# Patient Record
Sex: FEMALE | Race: WHITE | Employment: FULL TIME | URBAN - METROPOLITAN AREA
[De-identification: names, ages, dates, MRNs, and addresses within clinical notes are randomized per-mention and may not be internally consistent; named-entity substitution may affect disease eponyms.]

---

## 2018-07-31 ENCOUNTER — HOSPITAL ENCOUNTER (EMERGENCY)
Age: 23
Discharge: HOME | End: 2018-07-31
Attending: EMERGENCY MEDICINE
Payer: COMMERCIAL

## 2018-07-31 VITALS
DIASTOLIC BLOOD PRESSURE: 75 MMHG | WEIGHT: 120 LBS | BODY MASS INDEX: 22.66 KG/M2 | SYSTOLIC BLOOD PRESSURE: 111 MMHG | RESPIRATION RATE: 14 BRPM | HEART RATE: 92 BPM | OXYGEN SATURATION: 100 % | HEIGHT: 61 IN | TEMPERATURE: 98.1 F

## 2018-07-31 DIAGNOSIS — B00.1 COLD SORE: Primary | ICD-10-CM

## 2018-07-31 DIAGNOSIS — R51.9 ACUTE NONINTRACTABLE HEADACHE, UNSPECIFIED HEADACHE TYPE: ICD-10-CM

## 2018-07-31 PROCEDURE — 99282 EMERGENCY DEPT VISIT SF MDM: CPT

## 2018-07-31 PROCEDURE — 6370000000 HC RX 637 (ALT 250 FOR IP): Performed by: PHYSICIAN ASSISTANT

## 2018-07-31 RX ORDER — DOCOSANOL 100 MG/G
CREAM TOPICAL
Qty: 1 TUBE | Refills: 0 | Status: SHIPPED | OUTPATIENT
Start: 2018-07-31

## 2018-07-31 RX ORDER — IBUPROFEN 800 MG/1
800 TABLET ORAL ONCE
Status: COMPLETED | OUTPATIENT
Start: 2018-07-31 | End: 2018-07-31

## 2018-07-31 RX ORDER — FAMCICLOVIR 500 MG/1
500 TABLET, FILM COATED ORAL 3 TIMES DAILY
Qty: 15 TABLET | Refills: 0 | Status: SHIPPED | OUTPATIENT
Start: 2018-07-31 | End: 2018-08-05

## 2018-07-31 RX ADMIN — IBUPROFEN 800 MG: 800 TABLET ORAL at 17:52

## 2018-07-31 ASSESSMENT — PAIN SCALES - GENERAL: PAINLEVEL_OUTOF10: 3

## 2018-07-31 NOTE — ED PROVIDER NOTES
mouth every 6 hours as needed for Pain, Disp-30 tablet, R-0      montelukast (SINGULAIR) 10 MG tablet Take 10 mg by mouth nightly. albuterol (PROAIR HFA) 108 (90 BASE) MCG/ACT inhaler Inhale 2 puffs into the lungs every 6 hours as needed. albuterol (PROVENTIL) (2.5 MG/3ML) 0.083% nebulizer solution Take 2.5 mg by nebulization every 6 hours as needed. ALLERGIES     Patient has no known allergies. FAMILY HISTORY       Family History   Problem Relation Age of Onset    Asthma Mother     Allergy (Severe) Mother     Allergy (Severe) Sister     Allergy (Severe) Brother     Asthma Maternal Aunt      Family Status   Relation Status    Mother (Not Specified)    Sister (Not Specified)    Brother (Not Specified)    MAunt (Not Specified)      None otherwise stated in nurses notes    SOCIAL HISTORY      reports that she is a non-smoker but has been exposed to tobacco smoke. She has never used smokeless tobacco. She reports that she does not drink alcohol or use drugs. lives at home with others     PHYSICAL EXAM    (up to 7 for level 4, 8 or more for level 5)     ED Triage Vitals [07/31/18 1726]   BP Temp Temp Source Pulse Resp SpO2 Height Weight   115/65 98.1 °F (36.7 °C) Oral 109 18 99 % 5' 1\" (1.549 m) 120 lb (54.4 kg)       Physical Exam   Nursing note and vitals reviewed. Constitutional: Oriented to person, place, and time and well-developed, well-nourished. Head: Normocephalic and atraumatic. Clustered vesicles over right lower lip. Erythematous. Tender. No drainage, induration. Crusting noted. Ear: External ears normal.   Nose: Nose normal and midline. Eyes: Conjunctivae and EOM are normal. Pupils are equal, round, and reactive to light. Neck: Normal range of motion. Neck supple. right cervical lymphadenopathy.    Throat: Posterior pharynx is without erythema or exudates, airway is patent, no swelling  Cardiovascular: Normal rate, regular rhythm, normal heart sounds and Cold sore    2. Acute nonintractable headache, unspecified headache type          DISPOSITION/PLAN   DISPOSITION Decision To Discharge    PATIENT REFERRED TO:  [facility]  [address] [address]  [phone]    If symptoms worsen    pcp  see clinic list  Call         DISCHARGE MEDICATIONS:  Discharge Medication List as of 7/31/2018  5:45 PM      START taking these medications    Details   famciclovir (FAMVIR) 500 MG tablet Take 1 tablet by mouth 3 times daily for 5 days, Disp-15 tablet, R-0Print      docosanol (ABREVA) 10 % CREA cream Apply to cold sore 5 times a day as needed. , Disp-1 Tube, R-0Print               Summation      Patient Course:  Pain for several days, sore over lip appeared yesterday. Exam is consistent with cold sore. Afebrile. Pt has hx of cold sores. Will treat with antivirals and abreva. Informed the sore is contagious. She is not to kiss her kids, share drinks, food until sore is healed. Instructions provided. Pt understands and agrees with plan. ED Medications administered this visit:    Medications   ibuprofen (ADVIL;MOTRIN) tablet 800 mg (800 mg Oral Given 7/31/18 1752)       New Prescriptions from this visit:    Discharge Medication List as of 7/31/2018  5:45 PM      START taking these medications    Details   famciclovir (FAMVIR) 500 MG tablet Take 1 tablet by mouth 3 times daily for 5 days, Disp-15 tablet, R-0Print      docosanol (ABREVA) 10 % CREA cream Apply to cold sore 5 times a day as needed. , Disp-1 Tube, R-0Print             Follow-up:  [facility]  [address] [id number] [phone]    If symptoms worsen    pcp  see clinic list  Call           Final Impression:   1. Cold sore    2.  Acute nonintractable headache, unspecified headache type               (Please note that portions of this note were completed with a voice recognition program.  Efforts were made to edit the dictations but occasionally words are mis-transcribed.)      (Please note that portions of this note were completed with a voice recognition program.  Efforts were made to edit the dictations but occasionally words are mis-transcribed.)    Hayes Riley, [address] Par[address] Chew PA-C  07/31/18 9880

## 2021-03-09 ENCOUNTER — HOSPITAL ENCOUNTER (OUTPATIENT)
Age: 26
Discharge: HOME | End: 2021-03-09

## 2021-03-09 LAB — RUBV IGG SER QL: 62.4 IU/ML

## 2021-03-09 PROCEDURE — 86787 VARICELLA-ZOSTER ANTIBODY: CPT

## 2021-03-09 PROCEDURE — 86762 RUBELLA ANTIBODY: CPT

## 2021-03-09 PROCEDURE — 86481 TB AG RESPONSE T-CELL SUSP: CPT

## 2021-03-09 PROCEDURE — 86735 MUMPS ANTIBODY: CPT

## 2021-03-09 PROCEDURE — 86765 RUBEOLA ANTIBODY: CPT

## 2021-03-10 LAB
MEASLES ANTIBODY IGG: 3.61
MUV IGG SER QL: 4.16
VZV IGG SER QL IA: 1.9

## 2021-03-12 LAB — T-SPOT TB TEST: NORMAL

## 2021-11-19 ENCOUNTER — HOSPITAL ENCOUNTER (OUTPATIENT)
Age: 26
Setting detail: SPECIMEN
Discharge: HOME | End: 2021-11-19

## 2021-11-19 LAB
BILIRUBIN URINE: NEGATIVE
COLOR: YELLOW
COMMENT UA: ABNORMAL
GLUCOSE URINE: NEGATIVE
KETONES, URINE: ABNORMAL
LEUKOCYTE ESTERASE, URINE: NEGATIVE
NITRITE, URINE: NEGATIVE
PH UA: 7.5 (ref 5–8)
PROTEIN UA: NEGATIVE
SPECIFIC GRAVITY UA: 1.02 (ref 1–1.03)
TURBIDITY: CLEAR
URINE HGB: NEGATIVE
UROBILINOGEN, URINE: NORMAL

## 2022-08-22 ENCOUNTER — HOSPITAL ENCOUNTER (OUTPATIENT)
Age: 27
Setting detail: SPECIMEN
Discharge: HOME | End: 2022-08-22

## 2022-08-22 LAB
ABSOLUTE EOS #: 0.15 K/UL (ref 0–0.44)
ABSOLUTE IMMATURE GRANULOCYTE: <0.03 K/UL (ref 0–0.3)
ABSOLUTE LYMPH #: 1.86 K/UL (ref 1.1–3.7)
ABSOLUTE MONO #: 0.45 K/UL (ref 0.1–1.2)
ALBUMIN SERPL-MCNC: 4.7 G/DL (ref 3.5–5.2)
ALBUMIN/GLOBULIN RATIO: 1.6 (ref 1–2.5)
ALP BLD-CCNC: 52 U/L (ref 35–104)
ALT SERPL-CCNC: 45 U/L (ref 5–33)
ANION GAP SERPL CALCULATED.3IONS-SCNC: 19 MMOL/L (ref 9–17)
AST SERPL-CCNC: 39 U/L
BASOPHILS # BLD: 1 % (ref 0–2)
BASOPHILS ABSOLUTE: 0.05 K/UL (ref 0–0.2)
BILIRUB SERPL-MCNC: 0.44 MG/DL (ref 0.3–1.2)
BUN BLDV-MCNC: 9 MG/DL (ref 6–20)
CALCIUM SERPL-MCNC: 9.6 MG/DL (ref 8.6–10.4)
CHLORIDE BLD-SCNC: 100 MMOL/L (ref 98–107)
CHOLESTEROL/HDL RATIO: 4.8
CHOLESTEROL: 186 MG/DL
CO2: 21 MMOL/L (ref 20–31)
CREAT SERPL-MCNC: 0.7 MG/DL (ref 0.5–0.9)
EOSINOPHILS RELATIVE PERCENT: 3 % (ref 1–4)
FERRITIN: 81 NG/ML (ref 13–150)
FOLATE: 13.5 NG/ML
GFR AFRICAN AMERICAN: >60 ML/MIN
GFR NON-AFRICAN AMERICAN: >60 ML/MIN
GFR SERPL CREATININE-BSD FRML MDRD: ABNORMAL ML/MIN/{1.73_M2}
GLUCOSE BLD-MCNC: 104 MG/DL (ref 70–99)
HCT VFR BLD CALC: 42.5 % (ref 36.3–47.1)
HDLC SERPL-MCNC: 39 MG/DL
HEMOGLOBIN: 14.7 G/DL (ref 11.9–15.1)
IMMATURE GRANULOCYTES: 0 %
IRON SATURATION: 43 % (ref 20–55)
IRON: 127 UG/DL (ref 37–145)
LDL CHOLESTEROL: 116 MG/DL (ref 0–130)
LYMPHOCYTES # BLD: 31 % (ref 24–43)
MCH RBC QN AUTO: 31 PG (ref 25.2–33.5)
MCHC RBC AUTO-ENTMCNC: 34.6 G/DL (ref 28.4–34.8)
MCV RBC AUTO: 89.7 FL (ref 82.6–102.9)
MONOCYTES # BLD: 8 % (ref 3–12)
NRBC AUTOMATED: 0 PER 100 WBC
PDW BLD-RTO: 11.8 % (ref 11.8–14.4)
PLATELET # BLD: 307 K/UL (ref 138–453)
PMV BLD AUTO: 10.6 FL (ref 8.1–13.5)
POTASSIUM SERPL-SCNC: 4.5 MMOL/L (ref 3.7–5.3)
RBC # BLD: 4.74 M/UL (ref 3.95–5.11)
SEG NEUTROPHILS: 57 % (ref 36–65)
SEGMENTED NEUTROPHILS ABSOLUTE COUNT: 3.45 K/UL (ref 1.5–8.1)
SODIUM BLD-SCNC: 140 MMOL/L (ref 135–144)
T3 TOTAL: 126 NG/DL (ref 60–181)
TOTAL IRON BINDING CAPACITY: 294 UG/DL (ref 250–450)
TOTAL PROTEIN: 7.6 G/DL (ref 6.4–8.3)
TRIGL SERPL-MCNC: 156 MG/DL
TSH SERPL DL<=0.05 MIU/L-ACNC: 2.05 UIU/ML (ref 0.3–5)
UNSATURATED IRON BINDING CAPACITY: 167 UG/DL (ref 112–347)
VITAMIN B-12: 534 PG/ML (ref 232–1245)
VITAMIN D 25-HYDROXY: 21.5 NG/ML
WBC # BLD: 6 K/UL (ref 3.5–11.3)

## 2022-08-23 LAB — T4 TOTAL: 8.1 UG/DL (ref 4.5–10.9)

## 2022-08-26 ENCOUNTER — HOSPITAL ENCOUNTER (OUTPATIENT)
Age: 27
Setting detail: SPECIMEN
Discharge: HOME | End: 2022-08-26

## 2022-08-26 LAB
CANDIDA SPECIES, DNA PROBE: NEGATIVE
GARDNERELLA VAGINALIS, DNA PROBE: POSITIVE
SOURCE: ABNORMAL
TRICHOMONAS VAGINALIS DNA: NEGATIVE

## 2022-08-29 LAB
C TRACH DNA GENITAL QL NAA+PROBE: NEGATIVE
N. GONORRHOEAE DNA: NEGATIVE
SPECIMEN DESCRIPTION: NORMAL

## 2022-08-31 LAB
HPV SAMPLE: ABNORMAL
HPV, GENOTYPE 16: ABNORMAL
HPV, GENOTYPE 18: ABNORMAL
HPV, HIGH RISK OTHER: ABNORMAL
HPV, INTERPRETATION: ABNORMAL
SPECIMEN DESCRIPTION: ABNORMAL

## 2022-09-06 LAB — CYTOLOGY REPORT: NORMAL

## 2024-01-02 ENCOUNTER — HOSPITAL ENCOUNTER (OUTPATIENT)
Dept: ULTRASOUND IMAGING | Age: 29
Discharge: HOME | End: 2024-01-04

## 2024-01-02 DIAGNOSIS — R31.9 HEMATURIA, UNSPECIFIED TYPE: ICD-10-CM

## 2024-01-02 PROCEDURE — 76770 US EXAM ABDO BACK WALL COMP: CPT

## 2024-01-08 ENCOUNTER — OFFICE VISIT (OUTPATIENT)
Dept: UROLOGY | Age: 29
End: 2024-01-08
Payer: COMMERCIAL

## 2024-01-08 VITALS
SYSTOLIC BLOOD PRESSURE: 112 MMHG | DIASTOLIC BLOOD PRESSURE: 68 MMHG | TEMPERATURE: 98.2 F | HEIGHT: 61 IN | BODY MASS INDEX: 30.21 KG/M2 | HEART RATE: 72 BPM | OXYGEN SATURATION: 100 % | WEIGHT: 160 LBS

## 2024-01-08 DIAGNOSIS — R10.9 RIGHT FLANK PAIN: Primary | ICD-10-CM

## 2024-01-08 DIAGNOSIS — R31.0 GROSS HEMATURIA: ICD-10-CM

## 2024-01-08 DIAGNOSIS — N13.30 HYDRONEPHROSIS OF RIGHT KIDNEY: ICD-10-CM

## 2024-01-08 DIAGNOSIS — Z87.442 HISTORY OF KIDNEY STONES: ICD-10-CM

## 2024-01-08 PROCEDURE — 99204 OFFICE O/P NEW MOD 45 MIN: CPT | Performed by: UROLOGY

## 2024-01-08 RX ORDER — TRAMADOL HYDROCHLORIDE 50 MG/1
50 TABLET ORAL EVERY 6 HOURS PRN
COMMUNITY
Start: 2024-01-02 | End: 2024-01-09

## 2024-01-08 RX ORDER — ACETAMINOPHEN 500 MG
500 TABLET ORAL EVERY 6 HOURS PRN
COMMUNITY
Start: 2020-08-18

## 2024-01-08 RX ORDER — DEXAMETHASONE 4 MG/1
2 TABLET ORAL 2 TIMES DAILY
COMMUNITY
Start: 2023-12-29

## 2024-01-08 RX ORDER — TAMSULOSIN HYDROCHLORIDE 0.4 MG/1
0.4 CAPSULE ORAL DAILY
COMMUNITY

## 2024-01-08 RX ORDER — ONDANSETRON 4 MG/1
4 TABLET, ORALLY DISINTEGRATING ORAL EVERY 8 HOURS PRN
COMMUNITY
Start: 2019-12-27 | End: 2024-01-09

## 2024-01-08 ASSESSMENT — ENCOUNTER SYMPTOMS
EYE PAIN: 0
BACK PAIN: 1
NAUSEA: 1
SHORTNESS OF BREATH: 0
EYE REDNESS: 0
EYES NEGATIVE: 1
RESPIRATORY NEGATIVE: 1
WHEEZING: 0
VOMITING: 0
ABDOMINAL PAIN: 0
COUGH: 0

## 2024-01-08 NOTE — PROGRESS NOTES
[facility] PHYSICIANS [facility]  [address]  [facility] [address]  Dept: [phone]    [facility] Urology Office Note - New Patient    Patient:  Sherrie Richards  YOB: 1995  Date: 2024    The patient is a 28 y.o. female who presentstoday for evaluation of the following problems:   Chief Complaint   Patient presents with    Stone    referred by No primary care provider on file..    HPI  This is a very pleasant 28-year-old female who has a history of stones.  She has had intermittent right flank pain for the last month or so.  She did have a renal ultrasound last week which does show right hydronephrosis.  It also shows a nonobstructing right renal stone.  She does continue to have intermittent pain, although it is getting better.    (Patient's old records have been requested, reviewed and summarized in today's note.)    Summary of old records: N/A    History: N/A    ProceduresToday: N/A    Urinalysis today:  No results found for this visit on 24.    AUA Symptom Score (2024):                               Last BUN andcreatinine:  Lab Results   Component Value Date    BUN 9 2022     Lab Results   Component Value Date    CREATININE 0.70 2022       Additional Lab/Culture results: none    Reviewed during this Office Visit: none  (results were independently reviewed byphysician and radiology report verified)    PAST MEDICAL, FAMILY AND SOCIAL HISTORY:  Past Medical History:   Diagnosis Date    Asthma     MTHFR (methylene THF reductase) deficiency and homocystinuria (HCC)      Past Surgical History:   Procedure Laterality Date    ADENOIDECTOMY       SECTION      TONSILLECTOMY       Family History   Problem Relation Age of Onset    Asthma Mother     Allergy (Severe) Mother     Allergy (Severe) Sister     Allergy (Severe) Brother     Asthma Maternal Aunt      Outpatient Medications Marked as Taking for the

## 2024-01-08 NOTE — PROGRESS NOTES
Review of Systems   Constitutional: Negative.  Negative for activity change, chills and fever.   Eyes: Negative.  Negative for pain, redness and visual disturbance.   Respiratory: Negative.  Negative for cough, shortness of breath and wheezing.    Cardiovascular:  Negative for chest pain and leg swelling.   Gastrointestinal:  Positive for nausea. Negative for abdominal pain and vomiting.   Genitourinary:  Positive for frequency. Negative for difficulty urinating, dysuria, enuresis, flank pain, hematuria and urgency.   Musculoskeletal:  Positive for back pain. Negative for joint swelling and myalgias.   Skin:  Negative for rash and wound.   Neurological: Negative.  Negative for dizziness, tremors and numbness.   Hematological: Negative.  Does not bruise/bleed easily.

## 2024-01-09 ENCOUNTER — NURSE TRIAGE (OUTPATIENT)
Dept: OTHER | Facility: CLINIC | Age: 29
End: 2024-01-09

## 2024-01-09 ENCOUNTER — HOSPITAL ENCOUNTER (OUTPATIENT)
Age: 29
Setting detail: OBSERVATION
Discharge: HOME | End: 2024-01-12
Attending: EMERGENCY MEDICINE | Admitting: INTERNAL MEDICINE
Payer: COMMERCIAL

## 2024-01-09 ENCOUNTER — APPOINTMENT (OUTPATIENT)
Dept: CT IMAGING | Age: 29
End: 2024-01-09
Payer: COMMERCIAL

## 2024-01-09 DIAGNOSIS — N20.0 KIDNEY STONE: Primary | ICD-10-CM

## 2024-01-09 PROBLEM — N13.9 OBSTRUCTIVE UROPATHY: Status: ACTIVE | Noted: 2024-01-09

## 2024-01-09 LAB
ALBUMIN SERPL-MCNC: 4.4 G/DL (ref 3.5–5.2)
ALP SERPL-CCNC: 51 U/L (ref 35–104)
ALT SERPL-CCNC: 49 U/L (ref 5–33)
ANION GAP SERPL CALCULATED.3IONS-SCNC: 14 MMOL/L (ref 9–17)
AST SERPL-CCNC: 38 U/L
BACTERIA URNS QL MICRO: ABNORMAL
BASOPHILS # BLD: 0 K/UL (ref 0–0.2)
BASOPHILS NFR BLD: 0 % (ref 0–2)
BILIRUB DIRECT SERPL-MCNC: 0.1 MG/DL
BILIRUB INDIRECT SERPL-MCNC: 0.4 MG/DL (ref 0–1)
BILIRUB SERPL-MCNC: 0.5 MG/DL (ref 0.3–1.2)
BILIRUB UR QL STRIP: NEGATIVE
BUN SERPL-MCNC: 10 MG/DL (ref 6–20)
CALCIUM SERPL-MCNC: 9.6 MG/DL (ref 8.6–10.4)
CHLORIDE SERPL-SCNC: 100 MMOL/L (ref 98–107)
CLARITY UR: CLEAR
CO2 SERPL-SCNC: 23 MMOL/L (ref 20–31)
COLOR UR: YELLOW
CREAT SERPL-MCNC: 0.9 MG/DL (ref 0.5–0.9)
EOSINOPHIL # BLD: 0.1 K/UL (ref 0–0.4)
EOSINOPHILS RELATIVE PERCENT: 1 % (ref 0–4)
EPI CELLS #/AREA URNS HPF: ABNORMAL /HPF
ERYTHROCYTE [DISTWIDTH] IN BLOOD BY AUTOMATED COUNT: 12.3 % (ref 11.5–14.9)
GFR SERPL CREATININE-BSD FRML MDRD: >60 ML/MIN/1.73M2
GLUCOSE SERPL-MCNC: 103 MG/DL (ref 70–99)
GLUCOSE UR STRIP-MCNC: NEGATIVE MG/DL
HCG UR QL: NEGATIVE
HCT VFR BLD AUTO: 39.4 % (ref 36–46)
HGB BLD-MCNC: 13.4 G/DL (ref 12–16)
HGB UR QL STRIP.AUTO: ABNORMAL
KETONES UR STRIP-MCNC: NEGATIVE MG/DL
LACTATE BLDV-SCNC: 0.9 MMOL/L (ref 0.5–2.2)
LEUKOCYTE ESTERASE UR QL STRIP: ABNORMAL
LYMPHOCYTES NFR BLD: 1.4 K/UL (ref 1–4.8)
LYMPHOCYTES RELATIVE PERCENT: 15 % (ref 24–44)
MCH RBC QN AUTO: 30.4 PG (ref 26–34)
MCHC RBC AUTO-ENTMCNC: 34.1 G/DL (ref 31–37)
MCV RBC AUTO: 89.3 FL (ref 80–100)
MONOCYTES NFR BLD: 0.7 K/UL (ref 0.1–1.3)
MONOCYTES NFR BLD: 7 % (ref 1–7)
NEUTROPHILS NFR BLD: 77 % (ref 36–66)
NEUTS SEG NFR BLD: 7.1 K/UL (ref 1.3–9.1)
NITRITE UR QL STRIP: NEGATIVE
PH UR STRIP: 5.5 [PH] (ref 5–8)
PLATELET # BLD AUTO: 270 K/UL (ref 150–450)
PMV BLD AUTO: 7.9 FL (ref 6–12)
POTASSIUM SERPL-SCNC: 3.8 MMOL/L (ref 3.7–5.3)
PROT SERPL-MCNC: 7.5 G/DL (ref 6.4–8.3)
PROT UR STRIP-MCNC: ABNORMAL MG/DL
RBC # BLD AUTO: 4.41 M/UL (ref 4–5.2)
RBC #/AREA URNS HPF: ABNORMAL /HPF
SODIUM SERPL-SCNC: 137 MMOL/L (ref 135–144)
SP GR UR STRIP: 1.01 (ref 1–1.03)
UROBILINOGEN UR STRIP-ACNC: NORMAL EU/DL (ref 0–1)
WBC #/AREA URNS HPF: ABNORMAL /HPF
WBC OTHER # BLD: 9.3 K/UL (ref 3.5–11)

## 2024-01-09 PROCEDURE — 74176 CT ABD & PELVIS W/O CONTRAST: CPT

## 2024-01-09 PROCEDURE — 83605 ASSAY OF LACTIC ACID: CPT

## 2024-01-09 PROCEDURE — 81025 URINE PREGNANCY TEST: CPT

## 2024-01-09 PROCEDURE — G0378 HOSPITAL OBSERVATION PER HR: HCPCS

## 2024-01-09 PROCEDURE — 96374 THER/PROPH/DIAG INJ IV PUSH: CPT

## 2024-01-09 PROCEDURE — 96375 TX/PRO/DX INJ NEW DRUG ADDON: CPT

## 2024-01-09 PROCEDURE — 80048 BASIC METABOLIC PNL TOTAL CA: CPT

## 2024-01-09 PROCEDURE — 87086 URINE CULTURE/COLONY COUNT: CPT

## 2024-01-09 PROCEDURE — 6360000002 HC RX W HCPCS: Performed by: STUDENT IN AN ORGANIZED HEALTH CARE EDUCATION/TRAINING PROGRAM

## 2024-01-09 PROCEDURE — 96361 HYDRATE IV INFUSION ADD-ON: CPT

## 2024-01-09 PROCEDURE — 80076 HEPATIC FUNCTION PANEL: CPT

## 2024-01-09 PROCEDURE — 2580000003 HC RX 258: Performed by: NURSE PRACTITIONER

## 2024-01-09 PROCEDURE — 2580000003 HC RX 258: Performed by: STUDENT IN AN ORGANIZED HEALTH CARE EDUCATION/TRAINING PROGRAM

## 2024-01-09 PROCEDURE — 85025 COMPLETE CBC W/AUTO DIFF WBC: CPT

## 2024-01-09 PROCEDURE — 81001 URINALYSIS AUTO W/SCOPE: CPT

## 2024-01-09 PROCEDURE — 99285 EMERGENCY DEPT VISIT HI MDM: CPT

## 2024-01-09 PROCEDURE — 36415 COLL VENOUS BLD VENIPUNCTURE: CPT

## 2024-01-09 RX ORDER — POTASSIUM CHLORIDE 7.45 MG/ML
10 INJECTION INTRAVENOUS PRN
Status: DISCONTINUED | OUTPATIENT
Start: 2024-01-09 | End: 2024-01-12 | Stop reason: HOSPADM

## 2024-01-09 RX ORDER — SODIUM CHLORIDE 0.9 % (FLUSH) 0.9 %
10 SYRINGE (ML) INJECTION PRN
Status: DISCONTINUED | OUTPATIENT
Start: 2024-01-09 | End: 2024-01-12 | Stop reason: HOSPADM

## 2024-01-09 RX ORDER — ONDANSETRON 2 MG/ML
4 INJECTION INTRAMUSCULAR; INTRAVENOUS EVERY 6 HOURS PRN
Status: DISCONTINUED | OUTPATIENT
Start: 2024-01-09 | End: 2024-01-12 | Stop reason: HOSPADM

## 2024-01-09 RX ORDER — POTASSIUM CHLORIDE 20 MEQ/1
40 TABLET, EXTENDED RELEASE ORAL PRN
Status: DISCONTINUED | OUTPATIENT
Start: 2024-01-09 | End: 2024-01-12 | Stop reason: HOSPADM

## 2024-01-09 RX ORDER — TAMSULOSIN HYDROCHLORIDE 0.4 MG/1
0.4 CAPSULE ORAL DAILY
Status: DISCONTINUED | OUTPATIENT
Start: 2024-01-10 | End: 2024-01-10

## 2024-01-09 RX ORDER — MAGNESIUM SULFATE HEPTAHYDRATE 40 MG/ML
2000 INJECTION, SOLUTION INTRAVENOUS PRN
Status: DISCONTINUED | OUTPATIENT
Start: 2024-01-09 | End: 2024-01-12 | Stop reason: HOSPADM

## 2024-01-09 RX ORDER — ACETAMINOPHEN 650 MG/1
650 SUPPOSITORY RECTAL EVERY 6 HOURS PRN
Status: DISCONTINUED | OUTPATIENT
Start: 2024-01-09 | End: 2024-01-12 | Stop reason: HOSPADM

## 2024-01-09 RX ORDER — FLUTICASONE PROPIONATE 110 UG/1
2 AEROSOL, METERED RESPIRATORY (INHALATION) 2 TIMES DAILY
Status: DISCONTINUED | OUTPATIENT
Start: 2024-01-09 | End: 2024-01-12 | Stop reason: HOSPADM

## 2024-01-09 RX ORDER — SODIUM CHLORIDE 0.9 % (FLUSH) 0.9 %
5-40 SYRINGE (ML) INJECTION EVERY 12 HOURS SCHEDULED
Status: DISCONTINUED | OUTPATIENT
Start: 2024-01-09 | End: 2024-01-12 | Stop reason: HOSPADM

## 2024-01-09 RX ORDER — ALBUTEROL SULFATE 90 UG/1
2 AEROSOL, METERED RESPIRATORY (INHALATION) EVERY 6 HOURS PRN
Status: DISCONTINUED | OUTPATIENT
Start: 2024-01-09 | End: 2024-01-12 | Stop reason: HOSPADM

## 2024-01-09 RX ORDER — KETOROLAC TROMETHAMINE 30 MG/ML
30 INJECTION, SOLUTION INTRAMUSCULAR; INTRAVENOUS ONCE
Status: COMPLETED | OUTPATIENT
Start: 2024-01-09 | End: 2024-01-09

## 2024-01-09 RX ORDER — SODIUM CHLORIDE 9 MG/ML
INJECTION, SOLUTION INTRAVENOUS PRN
Status: DISCONTINUED | OUTPATIENT
Start: 2024-01-09 | End: 2024-01-12 | Stop reason: HOSPADM

## 2024-01-09 RX ORDER — ONDANSETRON 4 MG/1
4 TABLET, ORALLY DISINTEGRATING ORAL EVERY 8 HOURS PRN
Status: DISCONTINUED | OUTPATIENT
Start: 2024-01-09 | End: 2024-01-12 | Stop reason: HOSPADM

## 2024-01-09 RX ORDER — SODIUM CHLORIDE 9 MG/ML
INJECTION, SOLUTION INTRAVENOUS CONTINUOUS
Status: DISCONTINUED | OUTPATIENT
Start: 2024-01-09 | End: 2024-01-12 | Stop reason: HOSPADM

## 2024-01-09 RX ORDER — MORPHINE SULFATE 4 MG/ML
4 INJECTION, SOLUTION INTRAMUSCULAR; INTRAVENOUS
Status: COMPLETED | OUTPATIENT
Start: 2024-01-09 | End: 2024-01-09

## 2024-01-09 RX ORDER — 0.9 % SODIUM CHLORIDE 0.9 %
1000 INTRAVENOUS SOLUTION INTRAVENOUS ONCE
Status: COMPLETED | OUTPATIENT
Start: 2024-01-09 | End: 2024-01-09

## 2024-01-09 RX ORDER — ENOXAPARIN SODIUM 100 MG/ML
40 INJECTION SUBCUTANEOUS DAILY
Status: DISCONTINUED | OUTPATIENT
Start: 2024-01-10 | End: 2024-01-12 | Stop reason: HOSPADM

## 2024-01-09 RX ORDER — ACETAMINOPHEN 325 MG/1
650 TABLET ORAL EVERY 6 HOURS PRN
Status: DISCONTINUED | OUTPATIENT
Start: 2024-01-09 | End: 2024-01-12 | Stop reason: HOSPADM

## 2024-01-09 RX ORDER — KETOROLAC TROMETHAMINE 30 MG/ML
30 INJECTION, SOLUTION INTRAMUSCULAR; INTRAVENOUS EVERY 6 HOURS PRN
Status: DISCONTINUED | OUTPATIENT
Start: 2024-01-09 | End: 2024-01-12 | Stop reason: HOSPADM

## 2024-01-09 RX ORDER — ONDANSETRON 2 MG/ML
4 INJECTION INTRAMUSCULAR; INTRAVENOUS ONCE
Status: COMPLETED | OUTPATIENT
Start: 2024-01-09 | End: 2024-01-09

## 2024-01-09 RX ADMIN — ONDANSETRON 4 MG: 2 INJECTION INTRAMUSCULAR; INTRAVENOUS at 15:58

## 2024-01-09 RX ADMIN — SODIUM CHLORIDE: 9 INJECTION, SOLUTION INTRAVENOUS at 22:08

## 2024-01-09 RX ADMIN — MORPHINE SULFATE 4 MG: 4 INJECTION, SOLUTION INTRAMUSCULAR; INTRAVENOUS at 15:58

## 2024-01-09 RX ADMIN — SODIUM CHLORIDE 1000 ML: 9 INJECTION, SOLUTION INTRAVENOUS at 15:58

## 2024-01-09 RX ADMIN — KETOROLAC TROMETHAMINE 30 MG: 30 INJECTION, SOLUTION INTRAMUSCULAR; INTRAVENOUS at 15:57

## 2024-01-09 ASSESSMENT — LIFESTYLE VARIABLES
HOW OFTEN DO YOU HAVE A DRINK CONTAINING ALCOHOL: NEVER
HOW MANY STANDARD DRINKS CONTAINING ALCOHOL DO YOU HAVE ON A TYPICAL DAY: PATIENT DOES NOT DRINK

## 2024-01-09 NOTE — ED PROVIDER NOTES
[facility] MED SURG  Emergency Department Encounter  Emergency Medicine Resident     Pt Name:Sherrie Richards  MRN: [id number]  Birthdate 1995  Date of evaluation: 1/9/24  PCP:  Brielle Hayward APRN - CNP  Note Started: 3:03 PM EST      CHIEF COMPLAINT       Chief Complaint   Patient presents with    Flank Pain    Dysuria       HISTORY OF PRESENT ILLNESS  (Location/Symptom, Timing/Onset, Context/Setting, Quality, Duration, Modifying Factors, Severity.)      Shrerie Richards is a 28 y.o. female who presents with right-sided flank pain and dysuria.  Patient reports that she has been having approximately 1 month of flank pain.  She reports that she did have some blood in her urine approximately a month ago that has seemed to resolve but she does notice some spotting when she wipes.  She reports that is very pinkish and she is not sure if she is having vaginal bleeding.  She additionally reports that she is having significant frequency and making only small amounts of urine.  She reports that she is having some discomfort in the suprapubic region and in the right flank at this time.  She reports that her pain started on the left but seems to be in the right now.  She reports that she has been taking Flomax as advised by her nurse practitioner and had been taking tramadol but due to her significant nausea she stopped taking it as she was unsure if she was having a reaction to it.  She reports she has had more persistent pain today since 7 AM in addition to nausea and vomiting.  She denies any fevers but does acknowledge some chills.  She does not believe she be pregnant but does not have regular periods.  She denies vaginal discharge.  She reports that she did at one time passed a shard shaped stone.    PAST MEDICAL / SURGICAL / SOCIAL / FAMILY HISTORY      has a past medical history of Asthma and MTHFR (methylene THF reductase) deficiency and homocystinuria (HCC).     has a past surgical history that includes  12/29/23   Yumiko Francois MD   tamsulosin (FLOMAX) 0.4 MG capsule Take 1 capsule by mouth daily    Yumiko Francois MD   albuterol (PROAIR HFA) 108 (90 BASE) MCG/ACT inhaler Inhale 2 puffs into the lungs every 6 hours as needed    Yumiko Francois MD   albuterol (PROVENTIL) (2.5 MG/3ML) 0.083% nebulizer solution Take 2.5 mg by nebulization every 6 hours as needed.  Patient not taking: Reported on 1/9/2024    Yumiko Francois MD       REVIEW OF SYSTEMS       See HPI    PHYSICAL EXAM      INITIAL VITALS:   /82   Pulse 82   Temp 98.1 °F (36.7 °C)   Resp 18   Ht 1.54 m (5' 0.63\")   Wt 72.6 kg (160 lb)   SpO2 97%   BMI 30.60 kg/m²     Physical Exam  Vitals reviewed.   Constitutional:       General: She is not in acute distress.     Appearance: She is not ill-appearing.   Eyes:      Conjunctiva/sclera: Conjunctivae normal.   Cardiovascular:      Rate and Rhythm: Regular rhythm. Tachycardia present.      Heart sounds: Normal heart sounds.   Pulmonary:      Effort: Pulmonary effort is normal.      Breath sounds: Normal breath sounds.   Abdominal:      General: Abdomen is flat.      Tenderness: There is abdominal tenderness (suprapubic). There is right CVA tenderness. There is no left CVA tenderness.   Musculoskeletal:         General: Normal range of motion.   Skin:     General: Skin is warm and dry.   Neurological:      General: No focal deficit present.      Mental Status: She is alert.           DDX/DIAGNOSTIC RESULTS / EMERGENCY DEPARTMENT COURSE / MDM     Medical Decision Making  Patient is a 28-year-old female presenting due to nausea and right flank pain in conjunction with dysuria.  Upon examination patient appears nontoxic but is mildly tachycardic, somewhat hypertensive afebrile saturating well on room air in no acute distress.  Given patient's symptoms differential includes nephrolithiasis, urinary tract infection, pyelonephritis, appendicitis, pregnancy, ectopic pregnancy,

## 2024-01-09 NOTE — TELEPHONE ENCOUNTER
Location of patient: [place]    Subjective: Caller(Keren) from [facility] on the line stating she would like to transfer member to nurse line for question.  Hard transfer rec'd , pt states she just spoke to nurse triage, does not have further questions, that she is planning to go to ER now. States she was advised by [facility] that she needed to talk to nurse again.     Triage not indicated, Denies new or worsening sx since she spoke to triage nurse at 1317 today.    Outcome; Pt going to ER Now as previously advised     Attention Provider:  Thank you for allowing me to participate in the care of your patient.  The patient was connected to triage in response to symptoms provided.   Please do not respond through this encounter as the response is not directed to a shared pool      Reason for Disposition   Caller has already spoken with another triager and has no further questions    Protocols used: No Contact or Duplicate Contact Call-ADULT-[place]

## 2024-01-09 NOTE — TELEPHONE ENCOUNTER
Location of patient: [place]    Subjective: Caller states \"I have been having kidney stones\"     Current Symptoms: Saw her PCP and her urologist yesterday and was supposed to have a CT scan Thursday.  Has been trying to reach her urologist and has not gotten a call back.  Woke up this AM at 0630 with excruciating pain again.  Hematuria.  R flank pain.  Unable to even sit due to the pain. Barely able to void even with drinking a lot of water.     Onset: several days ago; worsening    Associated Symptoms: reduced activity, decreased urination    Pain Severity: 10+/10; severe, excruciating    Temperature:  denies fever    What has been tried: Drinking water    Recommended disposition: Go to ED Now    Care advice provided, patient verbalizes understanding; denies any other questions or concerns; instructed to call back for any new or worsening symptoms.    Patient/caller agrees to proceed to [facility]    Attention Provider:  Thank you for allowing me to participate in the care of your patient.  The patient was connected to triage in response to symptoms provided.   Please do not respond through this encounter as the response is not directed to a shared pool.  Reason for Disposition   SEVERE pain (e.g., excruciating, scale 8-10) and present > 1 hour    Protocols used: Flank Pain-ADULT-[place]

## 2024-01-09 NOTE — PROGRESS NOTES
Pharmacy Medication History Note      List of current medications patient is taking is complete.     Source of information: dispense report, OARRS    Changes made to medication list:  Medications flagged for removal (include reason, ex. noncompliance):  Tramadol - Per OARRS patient filled this on 1/2/24 with quantity 12 for 3 days  Ondansetron - no recent fill history  Abreva - patient requested removal   Ibuprofen - patient requested removal   Montelukast - patient requested removal    Medications removed (include reason, ex. therapy complete or physician discontinued):  None     Medications added/doses adjusted:  None     Other notes (ex. Recent course of antibiotics, Coumadin dosing):  OARRS as above, otherwise negative    Denies use of other OTC or herbal medications.      Allergies clarified    Medication list provided to the patient: no   Medication education provided to the patient: none      Electronically signed by Romy Saeed RPH on 1/9/2024 at 6:02 PM

## 2024-01-10 ENCOUNTER — ANESTHESIA EVENT (OUTPATIENT)
Dept: OPERATING ROOM | Age: 29
End: 2024-01-10
Payer: COMMERCIAL

## 2024-01-10 ENCOUNTER — TELEPHONE (OUTPATIENT)
Dept: UROLOGY | Age: 29
End: 2024-01-10

## 2024-01-10 PROBLEM — N20.0 KIDNEY STONE: Status: ACTIVE | Noted: 2024-01-10

## 2024-01-10 LAB
ANION GAP SERPL CALCULATED.3IONS-SCNC: 7 MMOL/L (ref 9–17)
BASOPHILS # BLD: 0 K/UL (ref 0–0.2)
BASOPHILS NFR BLD: 1 % (ref 0–2)
BUN SERPL-MCNC: 9 MG/DL (ref 6–20)
CALCIUM SERPL-MCNC: 8.5 MG/DL (ref 8.6–10.4)
CHLORIDE SERPL-SCNC: 107 MMOL/L (ref 98–107)
CO2 SERPL-SCNC: 24 MMOL/L (ref 20–31)
CREAT SERPL-MCNC: 0.8 MG/DL (ref 0.5–0.9)
EOSINOPHIL # BLD: 0.2 K/UL (ref 0–0.4)
EOSINOPHILS RELATIVE PERCENT: 3 % (ref 0–4)
ERYTHROCYTE [DISTWIDTH] IN BLOOD BY AUTOMATED COUNT: 12.5 % (ref 11.5–14.9)
GFR SERPL CREATININE-BSD FRML MDRD: >60 ML/MIN/1.73M2
GLUCOSE SERPL-MCNC: 96 MG/DL (ref 70–99)
HCT VFR BLD AUTO: 37.7 % (ref 36–46)
HGB BLD-MCNC: 12.7 G/DL (ref 12–16)
LYMPHOCYTES NFR BLD: 2.4 K/UL (ref 1–4.8)
LYMPHOCYTES RELATIVE PERCENT: 40 % (ref 24–44)
MCH RBC QN AUTO: 30.6 PG (ref 26–34)
MCHC RBC AUTO-ENTMCNC: 33.5 G/DL (ref 31–37)
MCV RBC AUTO: 91.1 FL (ref 80–100)
MONOCYTES NFR BLD: 0.5 K/UL (ref 0.1–1.3)
MONOCYTES NFR BLD: 8 % (ref 1–7)
NEUTROPHILS NFR BLD: 48 % (ref 36–66)
NEUTS SEG NFR BLD: 2.8 K/UL (ref 1.3–9.1)
PLATELET # BLD AUTO: 258 K/UL (ref 150–450)
PMV BLD AUTO: 8.1 FL (ref 6–12)
POTASSIUM SERPL-SCNC: 4.3 MMOL/L (ref 3.7–5.3)
RBC # BLD AUTO: 4.14 M/UL (ref 4–5.2)
SODIUM SERPL-SCNC: 138 MMOL/L (ref 135–144)
WBC OTHER # BLD: 5.9 K/UL (ref 3.5–11)

## 2024-01-10 PROCEDURE — 6360000002 HC RX W HCPCS: Performed by: INTERNAL MEDICINE

## 2024-01-10 PROCEDURE — 36415 COLL VENOUS BLD VENIPUNCTURE: CPT

## 2024-01-10 PROCEDURE — 2580000003 HC RX 258: Performed by: NURSE PRACTITIONER

## 2024-01-10 PROCEDURE — 2580000003 HC RX 258: Performed by: INTERNAL MEDICINE

## 2024-01-10 PROCEDURE — 6370000000 HC RX 637 (ALT 250 FOR IP): Performed by: INTERNAL MEDICINE

## 2024-01-10 PROCEDURE — 85025 COMPLETE CBC W/AUTO DIFF WBC: CPT

## 2024-01-10 PROCEDURE — 96372 THER/PROPH/DIAG INJ SC/IM: CPT

## 2024-01-10 PROCEDURE — 6370000000 HC RX 637 (ALT 250 FOR IP): Performed by: NURSE PRACTITIONER

## 2024-01-10 PROCEDURE — 96376 TX/PRO/DX INJ SAME DRUG ADON: CPT

## 2024-01-10 PROCEDURE — 80048 BASIC METABOLIC PNL TOTAL CA: CPT

## 2024-01-10 PROCEDURE — 96365 THER/PROPH/DIAG IV INF INIT: CPT

## 2024-01-10 PROCEDURE — 99223 1ST HOSP IP/OBS HIGH 75: CPT | Performed by: INTERNAL MEDICINE

## 2024-01-10 PROCEDURE — 6360000002 HC RX W HCPCS: Performed by: NURSE PRACTITIONER

## 2024-01-10 PROCEDURE — G0378 HOSPITAL OBSERVATION PER HR: HCPCS

## 2024-01-10 RX ORDER — TAMSULOSIN HYDROCHLORIDE 0.4 MG/1
0.4 CAPSULE ORAL 2 TIMES DAILY
Status: DISCONTINUED | OUTPATIENT
Start: 2024-01-10 | End: 2024-01-12 | Stop reason: HOSPADM

## 2024-01-10 RX ORDER — HYDROCODONE BITARTRATE AND ACETAMINOPHEN 5; 325 MG/1; MG/1
1 TABLET ORAL EVERY 6 HOURS PRN
Status: DISCONTINUED | OUTPATIENT
Start: 2024-01-10 | End: 2024-01-12 | Stop reason: HOSPADM

## 2024-01-10 RX ADMIN — TAMSULOSIN HYDROCHLORIDE 0.4 MG: 0.4 CAPSULE ORAL at 09:21

## 2024-01-10 RX ADMIN — KETOROLAC TROMETHAMINE 30 MG: 30 INJECTION, SOLUTION INTRAMUSCULAR; INTRAVENOUS at 11:02

## 2024-01-10 RX ADMIN — SODIUM CHLORIDE: 9 INJECTION, SOLUTION INTRAVENOUS at 04:32

## 2024-01-10 RX ADMIN — SODIUM CHLORIDE: 9 INJECTION, SOLUTION INTRAVENOUS at 18:21

## 2024-01-10 RX ADMIN — CEFTRIAXONE SODIUM 1000 MG: 1 INJECTION, POWDER, FOR SOLUTION INTRAMUSCULAR; INTRAVENOUS at 10:59

## 2024-01-10 RX ADMIN — TAMSULOSIN HYDROCHLORIDE 0.4 MG: 0.4 CAPSULE ORAL at 20:31

## 2024-01-10 RX ADMIN — SODIUM CHLORIDE: 9 INJECTION, SOLUTION INTRAVENOUS at 11:58

## 2024-01-10 RX ADMIN — ENOXAPARIN SODIUM 40 MG: 100 INJECTION SUBCUTANEOUS at 09:21

## 2024-01-10 RX ADMIN — ONDANSETRON 4 MG: 2 INJECTION INTRAMUSCULAR; INTRAVENOUS at 10:54

## 2024-01-10 ASSESSMENT — PAIN DESCRIPTION - ORIENTATION: ORIENTATION: RIGHT

## 2024-01-10 ASSESSMENT — PAIN DESCRIPTION - LOCATION: LOCATION: BACK

## 2024-01-10 ASSESSMENT — PAIN SCALES - GENERAL: PAINLEVEL_OUTOF10: 7

## 2024-01-10 ASSESSMENT — PAIN DESCRIPTION - DESCRIPTORS: DESCRIPTORS: CRAMPING

## 2024-01-10 NOTE — PLAN OF CARE
Problem: Discharge Planning  Goal: Discharge to home or other facility with appropriate resources  1/10/2024 1510 by Yasemin Woods, RN  Outcome: Progressing     Problem: ABCDS Injury Assessment  Goal: Absence of physical injury  1/10/2024 1510 by Yasemin Woods, RN  Outcome: Progressing

## 2024-01-10 NOTE — ANESTHESIA PRE PROCEDURE
Department of Anesthesiology  Preprocedure Note       Name:  Sherrie Richards   Age:  28 y.o.  :  1995                                          MRN:  [id number]         Date:  1/10/2024      Surgeon: Surgeon(s):  Mau Nicolas MD    Procedure: Procedure(s):  CYSTOSCOPY POSSIBLE URETEROSCOPY HOLMIUM LASER STENT PLACEMENT RIGHT    Medications prior to admission:   Prior to Admission medications    Medication Sig Start Date End Date Taking? Authorizing Provider   acetaminophen (TYLENOL) 500 MG tablet Take 1 tablet by mouth every 6 hours as needed 20   Yumiko Francois MD   FLOVENT  MCG/ACT inhaler Inhale 2 puffs into the lungs 2 times daily 23   Yumiko Francois MD   tamsulosin (FLOMAX) 0.4 MG capsule Take 1 capsule by mouth daily    Yumiko Francois MD   albuterol (PROAIR HFA) 108 (90 BASE) MCG/ACT inhaler Inhale 2 puffs into the lungs every 6 hours as needed    Yumiko Francois MD   albuterol (PROVENTIL) (2.5 MG/3ML) 0.083% nebulizer solution Take 2.5 mg by nebulization every 6 hours as needed.  Patient not taking: Reported on 2024    Yumiko Francois MD       Current medications:    Current Facility-Administered Medications   Medication Dose Route Frequency Provider Last Rate Last Admin   • cefTRIAXone (ROCEPHIN) 1,000 mg in sodium chloride 0.9 % 50 mL IVPB (mini-bag)  1,000 mg IntraVENous Q24H Tiara Taylor MD   Stopped at 01/10/24 1130   • tamsulosin (FLOMAX) capsule 0.4 mg  0.4 mg Oral BID Tiara Taylor MD       • HYDROcodone-acetaminophen (NORCO) 5-325 MG per tablet 1 tablet  1 tablet Oral Q6H PRN Tiara Taylor MD       • albuterol sulfate HFA (PROVENTIL;VENTOLIN;PROAIR) 108 (90 Base) MCG/ACT inhaler 2 puff  2 puff Inhalation Q6H PRN Verito Michele, APRN - CNP       • fluticasone (FLOVENT HFA) 110 MCG/ACT inhaler 2 puff  2 puff Inhalation BID Verito Michele, APRN - CNP       • sodium chloride flush 0.9 % injection 5-40 mL  5-40 mL IntraVENous 2 times

## 2024-01-10 NOTE — PROGRESS NOTES
Sherrie Richards is a 28 y.o. Non- / non  female who presents with Flank Pain and Dysuria   and is admitted to the hospital for the management of Obstructive uropathy.    According to patient, she has a past history of nephrolithiasis and has been having intermittent right flank pain since approximately December 17, 2023.  She was seen by urology on 1/8 after renal ultrasound showed right hydronephrosis secondary to nonobstructing right renal stone.  A CT was ordered but patient came to the ED today due to significant worsening of her right flank pain and nausea.  Patient also reports suprapubic discomfort urinary frequency, though she is often only able to pass his follow-up of urine.    Patient status observation in the medical surgical unit    Hospital Problems             Last Modified POA    * (Principal) Obstructive uropathy 1/9/2024 Yes     Nephrolithiasis  -CT abdomen pelvis-obstructing calculi in proximal right ureter  --More proximal stone measures 6.8 x 5.7 mm  --More distal stone measures 4.5 x 3.3 millimeters  ---Moderate hydronephrosis  ----Right-sided perinephric and periureteral stranding  --Bilateral nonobstructing renal calculi  --Simple appearing cortical cyst within the right kidney  --Diffuse fatty infiltration of liver with probable cysts or hemangiomas  --Cholelithiasis without evidence of acute cholecystitis  --Mild diverticulosis of the colon  -NPO after midnight  -UA - negative for nitrites; trace leukocyte estrace  -Creatinine 0.9; WBC 9.3  -IV fluids - NS @ 150 ml/hr  -Urology Consulted in ED  -Pain and nausea control - Toradol  -Continue home dose Flomax daily  -Strain all urine  -Urine culture pending     Disposition 2-3 days      Consultations:   IP CONSULT TO UROLOGY  IP CONSULT TO INTERNAL MEDICINE      ANGIE Sparrow - CNP  1/9/2024  7:57 PM

## 2024-01-10 NOTE — ED NOTES
Report called to the floor. Writer spoke with Nia ESPOSITO. Questions answered. Patient ready to be transferred to the unit.

## 2024-01-10 NOTE — CARE COORDINATION
Per VAIBHAV [place] pt requested resources for food pantry locations.   Writer met with pt and provided [facility] Food Assistance Resource Guide 2023.  Pt accepting of resource list, no further questions at this time.  Electronically signed by BIRGIT Diaz on 1/10/2024 at 3:42 PM

## 2024-01-10 NOTE — H&P
[facility]   IN-PATIENT SERVICE   [facility]    HISTORY AND PHYSICAL EXAMINATION            Date:   1/10/2024  Patient name:  Sherrie Richards  Date of admission:  2024  2:55 PM  MRN:   [id number]  Account:  [id number]  YOB: 1995  PCP:    Brielle Hayward APRN - CNP  Room:   [address] [place] [address]  Code Status:    Full Code    Chief Complaint:     Chief Complaint   Patient presents with    Flank Pain    Dysuria       History Obtained From:     patient    History of Present Illness:     Sherrie Richards is a 28 y.o. Non- / non  female who presents with Flank Pain and Dysuria   and is admitted to the hospital for the management of Obstructive uropathy.     According to patient, she has a past history of nephrolithiasis and has been having intermittent right flank pain since approximately 2023.  She was seen by urology on  after renal ultrasound showed right hydronephrosis secondary to nonobstructing right renal stone.  A CT was ordered but patient came to the ED today due to significant worsening of her right flank pain and nausea.  Patient also reports suprapubic discomfort urinary frequency, though she is often only able to pass his follow-up of urine.     Patient reports that pain is fairly well-controlled.      Past Medical History:     Past Medical History:   Diagnosis Date    Asthma     MTHFR (methylene THF reductase) deficiency and homocystinuria (HCC)         Past Surgical History:     Past Surgical History:   Procedure Laterality Date    ADENOIDECTOMY       SECTION      TONSILLECTOMY          Medications Prior to Admission:     Prior to Admission medications    Medication Sig Start Date End Date Taking? Authorizing Provider   acetaminophen (TYLENOL) 500 MG tablet Take 1 tablet by mouth every 6 hours as needed 20   Provider, MD Yumiko   FLOVENT  MCG/ACT inhaler Inhale 2 puffs into the lungs 2 times daily 23    anxiety    Physical Exam:   /73   Pulse 67   Temp 97.9 °F (36.6 °C)   Resp 18   Ht 1.54 m (5' 0.63\")   Wt 72.6 kg (160 lb)   SpO2 97%   BMI 30.60 kg/m²   Temp (24hrs), Av.1 °F (36.7 °C), Min:97.9 °F (36.6 °C), Max:98.2 °F (36.8 °C)    No results for input(s): \"POCGLU\" in the last 72 hours.    Intake/Output Summary (Last 24 hours) at 1/10/2024 0811  Last data filed at 1/10/2024 0559  Gross per 24 hour   Intake --   Output 400 ml   Net -400 ml       General Appearance:  alert, well appearing, and in no acute distress  Mental status: oriented to person, place, and time with normal affect  Head:  normocephalic, atraumatic.  Eye: no icterus, redness, pupils equal and reactive, extraocular eye movements intact, conjunctiva clear  Ear: normal external ear, no discharge, hearing intact  Nose:  no drainage noted  Mouth: mucous membranes moist  Neck: supple, no carotid bruits, thyroid not palpable  Lungs: Bilateral equal air entry, clear to ausculation, no wheezing, rales or rhonchi, normal effort  Cardiovascular: normal rate, regular rhythm, no murmur, gallop, rub.  Abdomen: Soft, nontender, nondistended, normal bowel sounds, no hepatomegaly or splenomegaly  Neurologic: There are no new focal motor or sensory deficits, normal muscle tone and bulk, no abnormal sensation, normal speech, cranial nerves II through XII grossly intact  Skin: No gross lesions, rashes, bruising or bleeding on exposed skin area  Extremities:  peripheral pulses palpable, no pedal edema or calf pain with palpation  Psych: normal affect     Investigations:      Laboratory Testing:  Recent Results (from the past 24 hour(s))   Urinalysis with Reflex to Culture    Collection Time: 24  3:33 PM    Specimen: Urine   Result Value Ref Range    Color, UA Yellow Yellow    Turbidity UA Clear Clear    Glucose, Ur NEGATIVE NEGATIVE mg/dL    Bilirubin Urine NEGATIVE NEGATIVE    Ketones, Urine NEGATIVE NEGATIVE mg/dL    Specific Declo, UA

## 2024-01-10 NOTE — CARE COORDINATION
Case Management Assessment  Initial Evaluation    Date/Time of Evaluation: 1/10/2024 1:54 PM  Assessment Completed by: Janie Esteban RN    If patient is discharged prior to next notation, then this note serves as note for discharge by case management.    Patient Name: Sherrie Richards                   YOB: 1995  Diagnosis: Kidney stone [N20.0]  Obstructive uropathy [N13.9]                   Date / Time: 1/9/2024  2:55 PM    Patient Admission Status: Observation   Readmission Risk (Low < 19, Mod (19-27), High > 27): No data recorded  Current PCP: Brielle Hayward APRN - CNP  PCP verified by CM? Yes    Chart Reviewed: Yes      History Provided by: Patient  Patient Orientation: Alert and Oriented    Patient Cognition: Alert    Hospitalization in the last 30 days (Readmission):  No    If yes, Readmission Assessment in CM Navigator will be completed.    Advance Directives:      Code Status: Full Code   Patient's Primary Decision Maker is:        Discharge Planning:    Patient lives with: Spouse/Significant Other, Children Type of Home: House  Primary Care Giver: Self  Patient Support Systems include: Spouse/Significant Other, Family Members   Current Financial resources: None  Current community resources: None  Current services prior to admission: None            Current DME:              Type of Home Care services:  None    ADLS  Prior functional level: Independent in ADLs/IADLs  Current functional level: Independent in ADLs/IADLs    PT AM-PAC:   /24  OT AM-PAC:   /24    Family can provide assistance at DC: Yes  Would you like Case Management to discuss the discharge plan with any other family members/significant others, and if so, who? No  Plans to Return to Present Housing: Yes  Other Identified Issues/Barriers to RETURNING to current housing:   Potential Assistance needed at Nonedischarge: Other (Comment) (Wanted information, for Food Pantries, Given by \[facility]\"")            Potential DME:    Patient  Pharmacist Admission Medication Reconciliation Pending Note    Prior to Admission Medications were reviewed by the pharmacist and pended for provider review during admission medication reconciliation.    Medications were pended by the pharmacist at this time as follows:    Pended Admission Order Reconciliation Actions - Toribio Moody [facility] 5/2/2019  3:41 PM     Order Name Action Reordered As    aspirin (ECOTRIN) 81 MG EC tablet Order for Admission aspirin (ECOTRIN) enteric coated tablet 81 mg    CRANBERRY CONCENTRATE PO Do Not Order for Admission     esomeprazole (NEXIUM) 40 MG capsule Order for Admission pantoprazole (PROTONIX) EC tablet 40 mg    fluticasone (FLONASE) 50 MCG/ACT nasal spray Do Not Order for Admission     furosemide (LASIX) 20 MG tablet Order for Admission furosemide (LASIX) tablet 20 mg    Loratadine (CLARITIN PO) Do Not Order for Admission     LORazepam (ATIVAN) 0.5 MG tablet Order for Admission LORazepam (ATIVAN) tablet 0.5 mg    zolpidem (AMBIEN) 10 MG tablet Order for Admission zolpidem (AMBIEN) tablet 5 mg    Valacyclovir HCl 1000 MG Tab Do Not Order for Admission     levothyroxine (SYNTHROID, LEVOTHROID) 137 MCG tablet Order for Admission levothyroxine (SYNTHROID, LEVOTHROID) tablet 137 mcg    simvastatin (ZOCOR) 10 MG tablet Order for Admission simvastatin (ZOCOR) tablet 10 mg    metoPROLOL succinate (TOPROL-XL) 100 MG 24 hr tablet Do Not Order for Admission     gabapentin (NEURONTIN) 300 MG capsule Order for Admission gabapentin (NEURONTIN) capsule 300 mg            Orders Pended To Continue For Hospital Stay     ID Description Pended By When Reason    [id number] aspirin (ECOTRIN) enteric coated tablet 81 mg-EVERY EVENING Toribiomartínez Moody [facility] 05/02/19 1541     [id number] pantoprazole (PROTONIX) EC tablet 40 mg-DAILY BEFORE BREAKFAST Toribiomartínez Moody [facility] 05/02/19 1541     [id number] furosemide (LASIX) tablet 20 mg-DAILY [facility]N [facility] 1541     [id number] gabapentin (NEURONTIN)  capsule 300 [facility]NIGHT[facility]     [id number] levothyroxine (SYNTHROID, LEVOTHROID) tablet 137 mcg-DAILY BEFORE BREAKFAST [facility] 1541     [id number] LORazepam (ATIVAN) tablet 0.5 mg-EVERY 6 HOURS PRN [facility] 1541     [id number] simvastatin (ZOCOR) tablet 10 mg-NIGHTLY [facility] 1541     [id number] zolpidem (AMBIEN) tablet 5 mg-NIGHTLY PRN [facility] 1541             Pharmacist Notations:           Orders that are ultimately reconciled and signed during admission medication reconciliation may differ from the pended actions above.    Please contact the pharmacist for questions.    Toribio Moody [facility]  5/2/2019 3:41 PM

## 2024-01-10 NOTE — TELEPHONE ENCOUNTER
Cysto, Possible (RT) URS, HLL, (RT) Stent Placement @ [facility] 1/11/24 12:30pm     PAT: Inpatient     Spoke with Vandana who will inform nurse that patient is having a procedure and to be NPO at midnight. Vandana verbalizes understanding and call was ended.

## 2024-01-10 NOTE — PLAN OF CARE
Problem: Discharge Planning  Goal: Discharge to home or other facility with appropriate resources  Outcome: Progressing     Problem: ABCDS Injury Assessment  Goal: Absence of physical injury  Outcome: Progressing

## 2024-01-10 NOTE — PROGRESS NOTES
Admitted to room 2078 from ED per wheelchair. Oriented to room and call light. Vitals and assessment completed. No distress noted.

## 2024-01-11 ENCOUNTER — APPOINTMENT (OUTPATIENT)
Dept: GENERAL RADIOLOGY | Age: 29
End: 2024-01-11
Payer: COMMERCIAL

## 2024-01-11 ENCOUNTER — ANESTHESIA (OUTPATIENT)
Dept: OPERATING ROOM | Age: 29
End: 2024-01-11
Payer: COMMERCIAL

## 2024-01-11 LAB
ANION GAP SERPL CALCULATED.3IONS-SCNC: 9 MMOL/L (ref 9–17)
BACTERIA URNS QL MICRO: ABNORMAL
BASOPHILS # BLD: 0.05 K/UL (ref 0–0.2)
BASOPHILS NFR BLD: 1 % (ref 0–2)
BILIRUB UR QL STRIP: NEGATIVE
BUN SERPL-MCNC: 6 MG/DL (ref 6–20)
CALCIUM SERPL-MCNC: 8.7 MG/DL (ref 8.6–10.4)
CHLORIDE SERPL-SCNC: 109 MMOL/L (ref 98–107)
CLARITY UR: ABNORMAL
CO2 SERPL-SCNC: 23 MMOL/L (ref 20–31)
COLOR UR: ABNORMAL
CREAT SERPL-MCNC: 0.7 MG/DL (ref 0.5–0.9)
EOSINOPHIL # BLD: 0.15 K/UL (ref 0–0.4)
EOSINOPHILS RELATIVE PERCENT: 3 % (ref 0–4)
EPI CELLS #/AREA URNS HPF: ABNORMAL /HPF
ERYTHROCYTE [DISTWIDTH] IN BLOOD BY AUTOMATED COUNT: 12.7 % (ref 11.5–14.9)
GFR SERPL CREATININE-BSD FRML MDRD: >60 ML/MIN/1.73M2
GLUCOSE SERPL-MCNC: 100 MG/DL (ref 70–99)
GLUCOSE UR STRIP-MCNC: NEGATIVE MG/DL
HCT VFR BLD AUTO: 36.4 % (ref 36–46)
HGB BLD-MCNC: 12.4 G/DL (ref 12–16)
HGB UR QL STRIP.AUTO: ABNORMAL
KETONES UR STRIP-MCNC: ABNORMAL MG/DL
LEUKOCYTE ESTERASE UR QL STRIP: ABNORMAL
LYMPHOCYTES NFR BLD: 2.45 K/UL (ref 1–4.8)
LYMPHOCYTES RELATIVE PERCENT: 48 % (ref 24–44)
MCH RBC QN AUTO: 30.5 PG (ref 26–34)
MCHC RBC AUTO-ENTMCNC: 33.9 G/DL (ref 31–37)
MCV RBC AUTO: 89.8 FL (ref 80–100)
MICROORGANISM SPEC CULT: NORMAL
MONOCYTES NFR BLD: 0.26 K/UL (ref 0.1–1.3)
MONOCYTES NFR BLD: 5 % (ref 1–7)
MORPHOLOGY: NORMAL
NEUTROPHILS NFR BLD: 43 % (ref 36–66)
NEUTS SEG NFR BLD: 2.19 K/UL (ref 1.3–9.1)
NITRITE UR QL STRIP: NEGATIVE
PH UR STRIP: 6.5 [PH] (ref 5–8)
PLATELET # BLD AUTO: 250 K/UL (ref 150–450)
PMV BLD AUTO: 8 FL (ref 6–12)
POTASSIUM SERPL-SCNC: 3.8 MMOL/L (ref 3.7–5.3)
PROT UR STRIP-MCNC: ABNORMAL MG/DL
RBC # BLD AUTO: 4.06 M/UL (ref 4–5.2)
RBC #/AREA URNS HPF: ABNORMAL /HPF
SODIUM SERPL-SCNC: 141 MMOL/L (ref 135–144)
SP GR UR STRIP: 1.01 (ref 1–1.03)
SPECIMEN DESCRIPTION: NORMAL
UROBILINOGEN UR STRIP-ACNC: NORMAL EU/DL (ref 0–1)
WBC #/AREA URNS HPF: ABNORMAL /HPF
WBC OTHER # BLD: 5.1 K/UL (ref 3.5–11)

## 2024-01-11 PROCEDURE — C2617 STENT, NON-COR, TEM W/O DEL: HCPCS | Performed by: UROLOGY

## 2024-01-11 PROCEDURE — 3700000000 HC ANESTHESIA ATTENDED CARE: Performed by: UROLOGY

## 2024-01-11 PROCEDURE — 81001 URINALYSIS AUTO W/SCOPE: CPT

## 2024-01-11 PROCEDURE — G0378 HOSPITAL OBSERVATION PER HR: HCPCS

## 2024-01-11 PROCEDURE — 6370000000 HC RX 637 (ALT 250 FOR IP): Performed by: UROLOGY

## 2024-01-11 PROCEDURE — 85025 COMPLETE CBC W/AUTO DIFF WBC: CPT

## 2024-01-11 PROCEDURE — 6360000002 HC RX W HCPCS: Performed by: INTERNAL MEDICINE

## 2024-01-11 PROCEDURE — 6360000002 HC RX W HCPCS: Performed by: NURSE ANESTHETIST, CERTIFIED REGISTERED

## 2024-01-11 PROCEDURE — 7100000001 HC PACU RECOVERY - ADDTL 15 MIN: Performed by: UROLOGY

## 2024-01-11 PROCEDURE — 3600000013 HC SURGERY LEVEL 3 ADDTL 15MIN: Performed by: UROLOGY

## 2024-01-11 PROCEDURE — 36415 COLL VENOUS BLD VENIPUNCTURE: CPT

## 2024-01-11 PROCEDURE — 3600000003 HC SURGERY LEVEL 3 BASE: Performed by: UROLOGY

## 2024-01-11 PROCEDURE — 2580000003 HC RX 258: Performed by: INTERNAL MEDICINE

## 2024-01-11 PROCEDURE — 6370000000 HC RX 637 (ALT 250 FOR IP): Performed by: ANESTHESIOLOGY

## 2024-01-11 PROCEDURE — 99232 SBSQ HOSP IP/OBS MODERATE 35: CPT | Performed by: INTERNAL MEDICINE

## 2024-01-11 PROCEDURE — 2500000003 HC RX 250 WO HCPCS: Performed by: NURSE ANESTHETIST, CERTIFIED REGISTERED

## 2024-01-11 PROCEDURE — 7100000000 HC PACU RECOVERY - FIRST 15 MIN: Performed by: UROLOGY

## 2024-01-11 PROCEDURE — 80048 BASIC METABOLIC PNL TOTAL CA: CPT

## 2024-01-11 PROCEDURE — C1769 GUIDE WIRE: HCPCS | Performed by: UROLOGY

## 2024-01-11 PROCEDURE — 2709999900 HC NON-CHARGEABLE SUPPLY: Performed by: UROLOGY

## 2024-01-11 PROCEDURE — 6360000002 HC RX W HCPCS: Performed by: UROLOGY

## 2024-01-11 PROCEDURE — C1758 CATHETER, URETERAL: HCPCS | Performed by: UROLOGY

## 2024-01-11 PROCEDURE — 2580000003 HC RX 258: Performed by: NURSE PRACTITIONER

## 2024-01-11 PROCEDURE — 3700000001 HC ADD 15 MINUTES (ANESTHESIA): Performed by: UROLOGY

## 2024-01-11 PROCEDURE — 2580000003 HC RX 258: Performed by: UROLOGY

## 2024-01-11 DEVICE — URETERAL STENT
Type: IMPLANTABLE DEVICE | Site: URETER | Status: NON-FUNCTIONAL
Brand: POLARIS™ ULTRA
Removed: 2024-01-19

## 2024-01-11 RX ORDER — PROPOFOL 10 MG/ML
INJECTION, EMULSION INTRAVENOUS PRN
Status: DISCONTINUED | OUTPATIENT
Start: 2024-01-11 | End: 2024-01-11 | Stop reason: SDUPTHER

## 2024-01-11 RX ORDER — SCOLOPAMINE TRANSDERMAL SYSTEM 1 MG/1
1 PATCH, EXTENDED RELEASE TRANSDERMAL ONCE
Status: DISCONTINUED | OUTPATIENT
Start: 2024-01-11 | End: 2024-01-12 | Stop reason: HOSPADM

## 2024-01-11 RX ORDER — KETOROLAC TROMETHAMINE 30 MG/ML
INJECTION, SOLUTION INTRAMUSCULAR; INTRAVENOUS PRN
Status: DISCONTINUED | OUTPATIENT
Start: 2024-01-11 | End: 2024-01-11 | Stop reason: SDUPTHER

## 2024-01-11 RX ORDER — ONDANSETRON 2 MG/ML
INJECTION INTRAMUSCULAR; INTRAVENOUS PRN
Status: DISCONTINUED | OUTPATIENT
Start: 2024-01-11 | End: 2024-01-11 | Stop reason: SDUPTHER

## 2024-01-11 RX ORDER — MIDAZOLAM HYDROCHLORIDE 1 MG/ML
INJECTION INTRAMUSCULAR; INTRAVENOUS PRN
Status: DISCONTINUED | OUTPATIENT
Start: 2024-01-11 | End: 2024-01-11 | Stop reason: SDUPTHER

## 2024-01-11 RX ORDER — LIDOCAINE HYDROCHLORIDE 20 MG/ML
INJECTION, SOLUTION EPIDURAL; INFILTRATION; INTRACAUDAL; PERINEURAL PRN
Status: DISCONTINUED | OUTPATIENT
Start: 2024-01-11 | End: 2024-01-11 | Stop reason: SDUPTHER

## 2024-01-11 RX ORDER — DEXAMETHASONE SODIUM PHOSPHATE 4 MG/ML
INJECTION, SOLUTION INTRA-ARTICULAR; INTRALESIONAL; INTRAMUSCULAR; INTRAVENOUS; SOFT TISSUE PRN
Status: DISCONTINUED | OUTPATIENT
Start: 2024-01-11 | End: 2024-01-11 | Stop reason: SDUPTHER

## 2024-01-11 RX ORDER — FENTANYL CITRATE 50 UG/ML
INJECTION, SOLUTION INTRAMUSCULAR; INTRAVENOUS PRN
Status: DISCONTINUED | OUTPATIENT
Start: 2024-01-11 | End: 2024-01-11 | Stop reason: SDUPTHER

## 2024-01-11 RX ADMIN — SODIUM CHLORIDE: 9 INJECTION, SOLUTION INTRAVENOUS at 19:10

## 2024-01-11 RX ADMIN — PROPOFOL 200 MG: 10 INJECTION, EMULSION INTRAVENOUS at 12:39

## 2024-01-11 RX ADMIN — HYDROCODONE BITARTRATE AND ACETAMINOPHEN 1 TABLET: 5; 325 TABLET ORAL at 15:00

## 2024-01-11 RX ADMIN — MIDAZOLAM 2 MG: 1 INJECTION INTRAMUSCULAR; INTRAVENOUS at 12:38

## 2024-01-11 RX ADMIN — SODIUM CHLORIDE: 9 INJECTION, SOLUTION INTRAVENOUS at 04:38

## 2024-01-11 RX ADMIN — FENTANYL CITRATE 100 MCG: 50 INJECTION, SOLUTION INTRAMUSCULAR; INTRAVENOUS at 12:45

## 2024-01-11 RX ADMIN — ONDANSETRON 4 MG: 2 INJECTION INTRAMUSCULAR; INTRAVENOUS at 12:51

## 2024-01-11 RX ADMIN — DEXAMETHASONE SODIUM PHOSPHATE 8 MG: 4 INJECTION INTRA-ARTICULAR; INTRALESIONAL; INTRAMUSCULAR; INTRAVENOUS; SOFT TISSUE at 12:44

## 2024-01-11 RX ADMIN — SODIUM CHLORIDE: 9 INJECTION, SOLUTION INTRAVENOUS at 11:10

## 2024-01-11 RX ADMIN — LIDOCAINE HYDROCHLORIDE 80 MG: 20 INJECTION, SOLUTION EPIDURAL; INFILTRATION; INTRACAUDAL; PERINEURAL at 12:38

## 2024-01-11 RX ADMIN — KETOROLAC TROMETHAMINE 30 MG: 30 INJECTION, SOLUTION INTRAMUSCULAR; INTRAVENOUS at 13:09

## 2024-01-11 RX ADMIN — ONDANSETRON 4 MG: 2 INJECTION INTRAMUSCULAR; INTRAVENOUS at 19:16

## 2024-01-11 RX ADMIN — CEFTRIAXONE SODIUM 1000 MG: 1 INJECTION, POWDER, FOR SOLUTION INTRAMUSCULAR; INTRAVENOUS at 11:11

## 2024-01-11 RX ADMIN — TAMSULOSIN HYDROCHLORIDE 0.4 MG: 0.4 CAPSULE ORAL at 19:16

## 2024-01-11 ASSESSMENT — ENCOUNTER SYMPTOMS: SHORTNESS OF BREATH: 0

## 2024-01-11 ASSESSMENT — PAIN DESCRIPTION - LOCATION
LOCATION: OTHER (COMMENT)
LOCATION: BACK

## 2024-01-11 ASSESSMENT — PAIN - FUNCTIONAL ASSESSMENT
PAIN_FUNCTIONAL_ASSESSMENT: ACTIVITIES ARE NOT PREVENTED
PAIN_FUNCTIONAL_ASSESSMENT: 0-10

## 2024-01-11 ASSESSMENT — PAIN SCALES - GENERAL: PAINLEVEL_OUTOF10: 6

## 2024-01-11 ASSESSMENT — PAIN DESCRIPTION - ORIENTATION: ORIENTATION: DISTAL;LOWER

## 2024-01-11 ASSESSMENT — PAIN DESCRIPTION - PAIN TYPE: TYPE: SURGICAL PAIN

## 2024-01-11 ASSESSMENT — PAIN DESCRIPTION - DESCRIPTORS
DESCRIPTORS: ACHING;TENDER;CRAMPING
DESCRIPTORS: BURNING
DESCRIPTORS: ACHING;DISCOMFORT

## 2024-01-11 NOTE — PLAN OF CARE
Problem: Discharge Planning  Goal: Discharge to home or other facility with appropriate resources  1/11/2024 1528 by Ksenia Williamson, RN  Outcome: Progressing     Problem: ABCDS Injury Assessment  Goal: Absence of physical injury  1/11/2024 1528 by Ksenia Williamson, RN  Outcome: Progressing

## 2024-01-11 NOTE — BRIEF OP NOTE
Brief Postoperative Note      Patient: Sherrie Richards  YOB: 1995  MRN: [id number]    Date of Procedure: 1/11/2024    Pre-Op Diagnosis Codes:     * Right kidney stone [N20.0]    Post-Op Diagnosis: Same       Procedure(s):  CYSTOSCOPY POSSIBLE URETEROSCOPY HOLMIUM LASER STENT PLACEMENT RIGHT    Surgeon(s):  Mau Nicolas MD    Assistant:  * No surgical staff found *    Anesthesia: General    Estimated Blood Loss (mL): Minimal    Complications: None    Specimens:   * No specimens in log *    Implants:  * No implants in log *      Drains: * No LDAs found *    Findings: d      Electronically signed by Mau Nicolas MD on 1/11/2024 at 12:32 PM

## 2024-01-11 NOTE — PROGRESS NOTES
Urology Progress Note    Subjective: still with right sided pain    Patient Vitals for the past 24 hrs:   BP Temp Temp src Pulse Resp SpO2   01/11/24 1126 115/78 98.4 °F (36.9 °C) Infrared 70 16 98 %   01/11/24 0621 107/74 97.9 °F (36.6 °C) -- 71 18 98 %   01/10/24 1826 107/71 97.9 °F (36.6 °C) -- 62 16 99 %       Intake/Output Summary (Last 24 hours) at 1/11/2024 1231  Last data filed at 1/10/2024 1909  Gross per 24 hour   Intake --   Output 1200 ml   Net -1200 ml       Recent Labs     01/09/24  1608 01/10/24  0542 01/11/24  0631   WBC 9.3 5.9 5.1   HGB 13.4 12.7 12.4   HCT 39.4 37.7 36.4   MCV 89.3 91.1 89.8    258 250     Recent Labs     01/09/24  1608 01/10/24  0542 01/11/24  0631    138 141   K 3.8 4.3 3.8    107 109*   CO2 23 24 23   BUN 10 9 6   CREATININE 0.9 0.8 0.7       Recent Labs     01/09/24  1533   COLORU Yellow   PHUR 5.5   WBCUA 3 to 5*   RBCUA 10 TO 20*   BACTERIA None   SPECGRAV 1.012   LEUKOCYTESUR TRACE*   UROBILINOGEN Normal   BILIRUBINUR NEGATIVE       Additional Lab/culture results:     Physical Exam: abd soft    Interval Imaging Findings: none    Impression:    Patient Active Problem List   Diagnosis    Asthma    Pregnancy complicated by coagulation defect (HCC)    SOB (shortness of breath)    Chest pain    Obstructive uropathy    Kidney stone       Plan: cysto right stent    Mau Nicolas MD  12:31 PM 1/11/2024

## 2024-01-11 NOTE — CARE COORDINATION
ONGOING DISCHARGE PLAN:    Patient is Currently in surgery at the time of Visit.    Pt. Is Having a Cysto w/ Urology.    Writer spoke to pt. Yesterday. She is from home w/ Family & had Denied Needs.     Will follow for any Post op rec/needs, when pt. Returns.     Will continue to follow for additional discharge needs.    If patient is discharged prior to next notation, then this note serves as note for discharge by case management.    Electronically signed by Janie Esteban RN on 1/11/2024 at 12:21 PM

## 2024-01-11 NOTE — PLAN OF CARE
Problem: Discharge Planning  Goal: Discharge to home or other facility with appropriate resources  1/11/2024 0429 by Yasmin Lundy, RN  Outcome: Progressing     Problem: ABCDS Injury Assessment  Goal: Absence of physical injury  1/11/2024 0429 by Yasmin Lundy, RN  Outcome: Progressing

## 2024-01-11 NOTE — PROGRESS NOTES
[facility]  IN-PATIENT SERVICE  [facility]    PROGRESS NOTE             Date:   1/11/2024  Patient name:  Sherrie Richards  Date of admission:  1/9/2024  2:55 PM  MRN:   [id number]  YOB: 1995    INTERVAL HISTORY:      SUBJECTIVE:  Patient is s/p procedure, reports mild suprapubic discomfort however otherwise doing well.  Has been voiding well.  Had some nausea however has resolved after scopolamine patch.    No other concerns.    Objective   Vitals:    01/11/24 1410 01/11/24 1441 01/11/24 1530 01/11/24 1832   BP: 107/77 112/71  108/78   Pulse: 57 58  61   Resp: 12 16 16 16   Temp: 97.7 °F (36.5 °C) 98.4 °F (36.9 °C)  98.1 °F (36.7 °C)   TempSrc:  Oral  Oral   SpO2: 96% 100%  97%   Weight:       Height:         BP Readings from Last 6 Encounters:   01/11/24 108/78   01/08/24 112/68   07/31/18 111/75   03/22/13 101/55 (28 %, Z = -0.58 /  19 %, Z = -0.88)*     *BP percentiles are based on the 2017 AAP Clinical Practice Guideline for girls          Intake/Output Summary (Last 24 hours) at 1/11/2024 1851  Last data filed at 1/11/2024 1650  Gross per 24 hour   Intake 250 ml   Output 1450 ml   Net -1200 ml     General appearance: well nourished  HEENT: Normocephalic, no lesions, without obvious abnormality.pupils equal and reactive, EOM normal  Lungs: clear to auscultation bilaterally  Heart: regular rate and rhythm, S1, S2 normal, no murmur, click, rub or gallop  Abdomen: soft, non-tender; bowel sounds normal; no masses,  no organomegaly  Extremities: extremities normal, atraumatic, no cyanosis or edema. Pulses 2+ and symmetrical in all 4 extremities  Neurological: Alert oriented x 3, moves all 4 extremities spontaneously.  Makes adequate conversation    CBC:   Recent Labs     01/09/24  1608 01/10/24  0542 01/11/24  0631   WBC 9.3 5.9 5.1   HGB 13.4 12.7 12.4    258 250     BMP:    Recent Labs     01/09/24  1608 01/10/24  0542 01/11/24  0631    138

## 2024-01-11 NOTE — INTERVAL H&P NOTE
Update History & Physical    The patient's History and Physical of January 11, h/p was reviewed with the patient and I examined the patient. There was no change. The surgical site was confirmed by the patient and me.     Plan: The risks, benefits, expected outcome, and alternative to the recommended procedure have been discussed with the patient. Patient understands and wants to proceed with the procedure.     Electronically signed by Mau Nicolas MD on 1/11/2024 at 12:32 PM

## 2024-01-11 NOTE — ANESTHESIA POSTPROCEDURE EVALUATION
Department of Anesthesiology  Postprocedure Note    Patient: Sherrie Richards  MRN: [id number]  YOB: 1995  Date of evaluation: 1/11/2024    Procedure Summary     Date: 01/11/24 Room / Location: [place] [address] / [facility]    Anesthesia Start: 1234 Anesthesia Stop: 1324    Procedure: CYSTOSCOPY, URETEROSCOPY, HOLMIUM LASER, STENT PLACEMENT RIGHT (Right: Ureter) Diagnosis:       Right kidney stone      (Right kidney stone [N20.0])    Surgeons: Mau Nicolas MD Responsible Provider: Jade Baron MD    Anesthesia Type: general ASA Status: 2          Anesthesia Type: No value filed.    Allison Phase I: Allison Score: 9    Allison Phase II:      Anesthesia Post Evaluation    Comments: POST- ANESTHESIA EVALUATION       Pt Name: Sherrie Richards  MRN: [id number]  YOB: 1995  Date of evaluation: 1/11/2024  Time:  2:19 PM      /77   Pulse 57   Temp 97.7 °F (36.5 °C)   Resp 12   Ht 1.54 m (5' 0.63\")   Wt 72.6 kg (160 lb)   SpO2 96%   BMI 30.60 kg/m²      Consciousness Level  Awake  Cardiopulmonary Status  Stable  Pain Adequately Treated YES  Nausea / Vomiting  NO  Adequate Hydration  YES  Anesthesia Related Complications NONE      Electronically signed by Jade Baron MD on 1/11/2024 at 2:19 PM        No notable events documented.

## 2024-01-12 VITALS
DIASTOLIC BLOOD PRESSURE: 68 MMHG | HEIGHT: 61 IN | SYSTOLIC BLOOD PRESSURE: 113 MMHG | RESPIRATION RATE: 18 BRPM | WEIGHT: 160 LBS | TEMPERATURE: 97.9 F | OXYGEN SATURATION: 99 % | HEART RATE: 79 BPM | BODY MASS INDEX: 30.21 KG/M2

## 2024-01-12 LAB
ALBUMIN SERPL-MCNC: 3.9 G/DL (ref 3.5–5.2)
ALP SERPL-CCNC: 45 U/L (ref 35–104)
ALT SERPL-CCNC: 32 U/L (ref 5–33)
ANION GAP SERPL CALCULATED.3IONS-SCNC: 10 MMOL/L (ref 9–17)
AST SERPL-CCNC: 21 U/L
BASOPHILS # BLD: 0 K/UL (ref 0–0.2)
BASOPHILS NFR BLD: 0 % (ref 0–2)
BILIRUB DIRECT SERPL-MCNC: <0.1 MG/DL
BILIRUB INDIRECT SERPL-MCNC: NORMAL MG/DL (ref 0–1)
BILIRUB SERPL-MCNC: 0.3 MG/DL (ref 0.3–1.2)
BUN SERPL-MCNC: 7 MG/DL (ref 6–20)
CALCIUM SERPL-MCNC: 9.2 MG/DL (ref 8.6–10.4)
CHLORIDE SERPL-SCNC: 107 MMOL/L (ref 98–107)
CO2 SERPL-SCNC: 21 MMOL/L (ref 20–31)
CREAT SERPL-MCNC: 0.6 MG/DL (ref 0.5–0.9)
EOSINOPHIL # BLD: 0 K/UL (ref 0–0.4)
EOSINOPHILS RELATIVE PERCENT: 0 % (ref 0–4)
ERYTHROCYTE [DISTWIDTH] IN BLOOD BY AUTOMATED COUNT: 12.3 % (ref 11.5–14.9)
GFR SERPL CREATININE-BSD FRML MDRD: >60 ML/MIN/1.73M2
GLUCOSE SERPL-MCNC: 104 MG/DL (ref 70–99)
HCT VFR BLD AUTO: 36.8 % (ref 36–46)
HGB BLD-MCNC: 12.5 G/DL (ref 12–16)
LYMPHOCYTES NFR BLD: 1.6 K/UL (ref 1–4.8)
LYMPHOCYTES RELATIVE PERCENT: 18 % (ref 24–44)
MAGNESIUM SERPL-MCNC: 1.7 MG/DL (ref 1.6–2.6)
MCH RBC QN AUTO: 30.5 PG (ref 26–34)
MCHC RBC AUTO-ENTMCNC: 34.1 G/DL (ref 31–37)
MCV RBC AUTO: 89.4 FL (ref 80–100)
MONOCYTES NFR BLD: 0.5 K/UL (ref 0.1–1.3)
MONOCYTES NFR BLD: 6 % (ref 1–7)
NEUTROPHILS NFR BLD: 76 % (ref 36–66)
NEUTS SEG NFR BLD: 6.9 K/UL (ref 1.3–9.1)
PLATELET # BLD AUTO: 250 K/UL (ref 150–450)
PMV BLD AUTO: 8 FL (ref 6–12)
POTASSIUM SERPL-SCNC: 3.9 MMOL/L (ref 3.7–5.3)
PROT SERPL-MCNC: 6.5 G/DL (ref 6.4–8.3)
RBC # BLD AUTO: 4.11 M/UL (ref 4–5.2)
SODIUM SERPL-SCNC: 138 MMOL/L (ref 135–144)
WBC OTHER # BLD: 9.1 K/UL (ref 3.5–11)

## 2024-01-12 PROCEDURE — 96366 THER/PROPH/DIAG IV INF ADDON: CPT

## 2024-01-12 PROCEDURE — 85025 COMPLETE CBC W/AUTO DIFF WBC: CPT

## 2024-01-12 PROCEDURE — 6360000002 HC RX W HCPCS: Performed by: UROLOGY

## 2024-01-12 PROCEDURE — 36415 COLL VENOUS BLD VENIPUNCTURE: CPT

## 2024-01-12 PROCEDURE — 96376 TX/PRO/DX INJ SAME DRUG ADON: CPT

## 2024-01-12 PROCEDURE — 6370000000 HC RX 637 (ALT 250 FOR IP): Performed by: UROLOGY

## 2024-01-12 PROCEDURE — 80048 BASIC METABOLIC PNL TOTAL CA: CPT

## 2024-01-12 PROCEDURE — 2580000003 HC RX 258: Performed by: UROLOGY

## 2024-01-12 PROCEDURE — G0378 HOSPITAL OBSERVATION PER HR: HCPCS

## 2024-01-12 PROCEDURE — 83735 ASSAY OF MAGNESIUM: CPT

## 2024-01-12 PROCEDURE — 99239 HOSP IP/OBS DSCHRG MGMT >30: CPT | Performed by: INTERNAL MEDICINE

## 2024-01-12 PROCEDURE — 80076 HEPATIC FUNCTION PANEL: CPT

## 2024-01-12 RX ORDER — CEPHALEXIN 500 MG/1
500 CAPSULE ORAL 4 TIMES DAILY
Qty: 28 CAPSULE | Refills: 0 | Status: ON HOLD | OUTPATIENT
Start: 2024-01-12 | End: 2024-01-19 | Stop reason: HOSPADM

## 2024-01-12 RX ADMIN — TAMSULOSIN HYDROCHLORIDE 0.4 MG: 0.4 CAPSULE ORAL at 08:19

## 2024-01-12 RX ADMIN — ONDANSETRON 4 MG: 2 INJECTION INTRAMUSCULAR; INTRAVENOUS at 08:19

## 2024-01-12 RX ADMIN — CEFTRIAXONE SODIUM 1000 MG: 1 INJECTION, POWDER, FOR SOLUTION INTRAMUSCULAR; INTRAVENOUS at 10:21

## 2024-01-12 RX ADMIN — SODIUM CHLORIDE: 9 INJECTION, SOLUTION INTRAVENOUS at 02:12

## 2024-01-12 NOTE — CARE COORDINATION
ONGOING DISCHARGE PLAN:    Patient is alert and oriented x4.    Spoke with patient regarding discharge plan and patient confirms that plan is still to return to home w/ Family.    Denies VNS.    Pt. Is POD #1, Cysto w/Urology.    DC today on PO Keflex.     Will continue to follow for additional discharge needs.    If patient is discharged prior to next notation, then this note serves as note for discharge by case management.    Electronically signed by Janie Esteban RN on 1/12/2024 at 9:13 AM

## 2024-01-12 NOTE — PLAN OF CARE
Problem: Discharge Planning  Goal: Discharge to home or other facility with appropriate resources  1/12/2024 0429 by Yasmin Lundy, RN  Outcome: Progressing     Problem: ABCDS Injury Assessment  Goal: Absence of physical injury  1/12/2024 0429 by Yasmin Lundy, RN  Outcome: Progressing

## 2024-01-12 NOTE — PLAN OF CARE
Problem: Discharge Planning  Goal: Discharge to home or other facility with appropriate resources  1/12/2024 1417 by Araceli Tsang, RN  Outcome: Progressing     Problem: ABCDS Injury Assessment  Goal: Absence of physical injury  1/12/2024 1417 by Araceli Tsang, RN  Outcome: Progressing

## 2024-01-12 NOTE — DISCHARGE SUMMARY
IN-PATIENT SERVICE   [facility]    Discharge Summary     Patient ID: Sherrie Richards  :  1995   MRN: [id number]     ACCOUNT:  [id number]   Patient's PCP: Brielle Hayward APRN - CNP  Admit Date: 2024   Discharge Date: 2024    Length of Stay: 0  Code Status:  Full Code  Admitting Physician: Walker Magallanes MD  Discharge Physician: Hermelinda Bacon MD     Active Discharge Diagnoses:     Primary Problem  Obstructive uropathy      Hospital Problems  Active Hospital Problems    Diagnosis Date Noted    Kidney stone [N20.0] 01/10/2024    Obstructive uropathy [N13.9] 2024       Admission Condition:  fair     Discharged Condition: fair    Hospital Stay:     Hospital Course:  Sherrie Richards is a 28 y.o. female who was admitted for the management of Obstructive uropathy , presented to ER with Flank Pain and Dysuria    Sherrie Richards is a 28 y.o. Non- / non  female who presents with Flank Pain and Dysuria   and is admitted to the hospital for the management of Obstructive uropathy.     According to patient, she has a past history of nephrolithiasis and has been having intermittent right flank pain since approximately 2023.  She was seen by urology on  after renal ultrasound showed right hydronephrosis secondary to nonobstructing right renal stone.  A CT was ordered but patient came to the ED today due to significant worsening of her right flank pain and nausea.  Patient also reports suprapubic discomfort urinary frequency, though she is often only able to pass his follow-up of urine.     Patient reports that pain is fairly well-controlled.    Patient was seen by urology, s/p cystoscopy and right stent placement  UA consistent with UTI  On the day of discharge patient denied any pain tolerating p.o. well denies any nausea vomiting, afebrile  Discharged home in stable condition, discharged on Keflex.  Physical exam  General : Patient alert  measures 6.8 x 5.7 mm. More distal of these measures 4.5 x 3.3 mm.  Moderate hydronephrosis.  Right-sided perinephric and periureteric stranding. Bilateral nonobstructing renal calculi. Simple appearing cortical cysts within the right kidney. Diffuse fatty infiltration of the liver.  Probable cysts or hemangiomas in the liver. Cholelithiasis without CT evidence of acute cholecystitis. Mild diverticulosis of the colon. Additional findings noted above.     US RETROPERITONEAL COMPLETE    Result Date: 1/2/2024  EXAMINATION: RETROPERITONEAL ULTRASOUND OF THE KIDNEYS AND URINARY BLADDER 1/2/2024 COMPARISON: None HISTORY: ORDERING SYSTEM PROVIDED HISTORY: Hematuria, unspecified type TECHNOLOGIST PROVIDED HISTORY: RENAL/BLADDER FINDINGS: Kidneys: The right kidney measures 12 cm in length and the left kidney measures 12.2 cm in length. Normal renal echotexture.  Nonobstructing right nephrolithiasis.  Mild right-sided hydronephrosis.  Left kidney unremarkable. Bladder: Unremarkable appearance of the bladder.  No significant post void residual.     Mild right-sided hydronephrosis. Nonobstructing right nephrolithiasis.         Consultations:    Consults:     Final Specialist Recommendations/Findings:   IP CONSULT TO UROLOGY  IP CONSULT TO INTERNAL MEDICINE      The patient was seen and examined on day of discharge and this discharge summary is in conjunction with any daily progress note from day of discharge.    Discharge plan:     Disposition: Home    Physician Follow Up:   No follow-up provider specified.     Requiring Further Evaluation/Follow Up POST HOSPITALIZATION/Incidental Findings:    Diet: cardiac diet    Activity: As tolerated    Instructions to Patient:     Discharge Medications:      Medication List        STOP taking these medications      docosanol 10 % Crea cream  Commonly known as: Abreva     ibuprofen 600 MG tablet  Commonly known as: ADVIL;MOTRIN     montelukast 10 MG tablet  Commonly known as: SINGULAIR

## 2024-01-12 NOTE — PROGRESS NOTES
Writer went through discharge paperwork with pt and pt family. No further questions, IV removed. Pt is walking down with family

## 2024-01-14 NOTE — OP NOTE
fiber to  fragment the stones.  I fragmented the stones in the small pieces.  I  progressed towards the proximal ureter.  I found additional stones, and  I fragmented those also.  Then, I removed the ureteroscope examining the  ureter and no other stones were identified.  I removed the ureteroscope.  I backloaded the cystoscope onto the wire, and I advanced a 5-Yakut x  24-cm double-J stent over the wire into the right kidney documenting  adequate position on fluoroscopy.  I drained the bladder and removed the  scope and that was the end of the procedure.  I did not leave a string  on the stent.  The patient will be discharged home.  She will be  scheduled again for the above procedure to confirm resolution of all the  stones with Dr. Barrera and this will be scheduled as an outpatient.  I  was present and scrubbed throughout the entire case.        MAU MURPHY    D: 01/14/2024 12:08:16       T: 01/14/2024 12:11:49     [id number]  Job#: [id number]     Doc#: [id number]    CC:  Mau Murphy

## 2024-01-15 ENCOUNTER — TELEPHONE (OUTPATIENT)
Dept: UROLOGY | Age: 29
End: 2024-01-15

## 2024-01-15 NOTE — TELEPHONE ENCOUNTER
Cysto, (RT) URS, HLL, (RT) Stent Exchange @ [facility] 01/19/24 2:00 pm     PAT: Same Day     Spoke with patient, procedure information sent via [employer].

## 2024-01-16 ENCOUNTER — TELEPHONE (OUTPATIENT)
Dept: UROLOGY | Age: 29
End: 2024-01-16

## 2024-01-16 NOTE — TELEPHONE ENCOUNTER
Patient called in stating \"I had a stent placed on the January 11th. I am still seeing blood in my urine. Is this normal?     Writer spoke with  blood is normal. Drink plenty of water. Message was relayed to patient. Verbalized understanding.

## 2024-01-18 ENCOUNTER — TELEPHONE (OUTPATIENT)
Dept: UROLOGY | Age: 29
End: 2024-01-18

## 2024-01-18 NOTE — TELEPHONE ENCOUNTER
Contacted/spoke with patient. Patient informed of time change for procedure on 01/19/24. Patient verbalizes understanding and call was ended.     Arrival: 9:15 am   Procedure Time: 11:15 am

## 2024-01-19 ENCOUNTER — ANESTHESIA (OUTPATIENT)
Dept: OPERATING ROOM | Age: 29
End: 2024-01-19
Payer: COMMERCIAL

## 2024-01-19 ENCOUNTER — HOSPITAL ENCOUNTER (OUTPATIENT)
Age: 29
Setting detail: OUTPATIENT SURGERY
Discharge: HOME | End: 2024-01-19
Attending: UROLOGY | Admitting: UROLOGY
Payer: COMMERCIAL

## 2024-01-19 ENCOUNTER — ANESTHESIA EVENT (OUTPATIENT)
Dept: OPERATING ROOM | Age: 29
End: 2024-01-19
Payer: COMMERCIAL

## 2024-01-19 ENCOUNTER — APPOINTMENT (OUTPATIENT)
Dept: GENERAL RADIOLOGY | Age: 29
End: 2024-01-19
Attending: UROLOGY
Payer: COMMERCIAL

## 2024-01-19 VITALS
SYSTOLIC BLOOD PRESSURE: 99 MMHG | HEIGHT: 60 IN | OXYGEN SATURATION: 98 % | HEART RATE: 77 BPM | WEIGHT: 160 LBS | BODY MASS INDEX: 31.41 KG/M2 | DIASTOLIC BLOOD PRESSURE: 71 MMHG | TEMPERATURE: 97.7 F | RESPIRATION RATE: 14 BRPM

## 2024-01-19 DIAGNOSIS — N20.0 KIDNEY STONE: Primary | ICD-10-CM

## 2024-01-19 LAB — HCG, PREGNANCY URINE (POC): NEGATIVE

## 2024-01-19 PROCEDURE — 2500000003 HC RX 250 WO HCPCS: Performed by: NURSE ANESTHETIST, CERTIFIED REGISTERED

## 2024-01-19 PROCEDURE — 6370000000 HC RX 637 (ALT 250 FOR IP): Performed by: ANESTHESIOLOGY

## 2024-01-19 PROCEDURE — 7100000010 HC PHASE II RECOVERY - FIRST 15 MIN: Performed by: UROLOGY

## 2024-01-19 PROCEDURE — 3600000013 HC SURGERY LEVEL 3 ADDTL 15MIN: Performed by: UROLOGY

## 2024-01-19 PROCEDURE — C1769 GUIDE WIRE: HCPCS | Performed by: UROLOGY

## 2024-01-19 PROCEDURE — C1758 CATHETER, URETERAL: HCPCS | Performed by: UROLOGY

## 2024-01-19 PROCEDURE — 7100000000 HC PACU RECOVERY - FIRST 15 MIN: Performed by: UROLOGY

## 2024-01-19 PROCEDURE — C2617 STENT, NON-COR, TEM W/O DEL: HCPCS | Performed by: UROLOGY

## 2024-01-19 PROCEDURE — 3700000001 HC ADD 15 MINUTES (ANESTHESIA): Performed by: UROLOGY

## 2024-01-19 PROCEDURE — 6360000002 HC RX W HCPCS: Performed by: NURSE ANESTHETIST, CERTIFIED REGISTERED

## 2024-01-19 PROCEDURE — 3700000000 HC ANESTHESIA ATTENDED CARE: Performed by: UROLOGY

## 2024-01-19 PROCEDURE — C1747 HC ENDOSCOPE, SINGLE, URINARY TRACT: HCPCS | Performed by: UROLOGY

## 2024-01-19 PROCEDURE — 6360000002 HC RX W HCPCS: Performed by: STUDENT IN AN ORGANIZED HEALTH CARE EDUCATION/TRAINING PROGRAM

## 2024-01-19 PROCEDURE — 2580000003 HC RX 258: Performed by: UROLOGY

## 2024-01-19 PROCEDURE — 2720000010 HC SURG SUPPLY STERILE: Performed by: UROLOGY

## 2024-01-19 PROCEDURE — 81025 URINE PREGNANCY TEST: CPT

## 2024-01-19 PROCEDURE — 2709999900 HC NON-CHARGEABLE SUPPLY: Performed by: UROLOGY

## 2024-01-19 PROCEDURE — 6360000002 HC RX W HCPCS: Performed by: ANESTHESIOLOGY

## 2024-01-19 PROCEDURE — 3600000003 HC SURGERY LEVEL 3 BASE: Performed by: UROLOGY

## 2024-01-19 PROCEDURE — 7100000011 HC PHASE II RECOVERY - ADDTL 15 MIN: Performed by: UROLOGY

## 2024-01-19 PROCEDURE — 7100000001 HC PACU RECOVERY - ADDTL 15 MIN: Performed by: UROLOGY

## 2024-01-19 PROCEDURE — 2580000003 HC RX 258: Performed by: ANESTHESIOLOGY

## 2024-01-19 DEVICE — URETERAL STENT
Type: IMPLANTABLE DEVICE | Site: URETER | Status: FUNCTIONAL
Brand: PERCUFLEX™ PLUS

## 2024-01-19 RX ORDER — FENTANYL CITRATE 50 UG/ML
25 INJECTION, SOLUTION INTRAMUSCULAR; INTRAVENOUS
Status: DISCONTINUED | OUTPATIENT
Start: 2024-01-19 | End: 2024-01-19 | Stop reason: HOSPADM

## 2024-01-19 RX ORDER — FENTANYL CITRATE 50 UG/ML
INJECTION, SOLUTION INTRAMUSCULAR; INTRAVENOUS PRN
Status: DISCONTINUED | OUTPATIENT
Start: 2024-01-19 | End: 2024-01-19 | Stop reason: SDUPTHER

## 2024-01-19 RX ORDER — DEXAMETHASONE SODIUM PHOSPHATE 10 MG/ML
INJECTION INTRAMUSCULAR; INTRAVENOUS PRN
Status: DISCONTINUED | OUTPATIENT
Start: 2024-01-19 | End: 2024-01-19 | Stop reason: SDUPTHER

## 2024-01-19 RX ORDER — MIDAZOLAM HYDROCHLORIDE 1 MG/ML
INJECTION INTRAMUSCULAR; INTRAVENOUS PRN
Status: DISCONTINUED | OUTPATIENT
Start: 2024-01-19 | End: 2024-01-19 | Stop reason: SDUPTHER

## 2024-01-19 RX ORDER — FENTANYL CITRATE 50 UG/ML
50 INJECTION, SOLUTION INTRAMUSCULAR; INTRAVENOUS
Status: DISCONTINUED | OUTPATIENT
Start: 2024-01-19 | End: 2024-01-19 | Stop reason: HOSPADM

## 2024-01-19 RX ORDER — SODIUM CHLORIDE, SODIUM LACTATE, POTASSIUM CHLORIDE, CALCIUM CHLORIDE 600; 310; 30; 20 MG/100ML; MG/100ML; MG/100ML; MG/100ML
INJECTION, SOLUTION INTRAVENOUS CONTINUOUS
Status: DISCONTINUED | OUTPATIENT
Start: 2024-01-19 | End: 2024-01-19 | Stop reason: HOSPADM

## 2024-01-19 RX ORDER — ONDANSETRON 2 MG/ML
4 INJECTION INTRAMUSCULAR; INTRAVENOUS
Status: CANCELLED | OUTPATIENT
Start: 2024-01-19 | End: 2024-01-20

## 2024-01-19 RX ORDER — APREPITANT 40 MG/1
40 CAPSULE ORAL ONCE
Status: COMPLETED | OUTPATIENT
Start: 2024-01-19 | End: 2024-01-19

## 2024-01-19 RX ORDER — SODIUM CHLORIDE 0.9 % (FLUSH) 0.9 %
5-40 SYRINGE (ML) INJECTION EVERY 12 HOURS SCHEDULED
Status: CANCELLED | OUTPATIENT
Start: 2024-01-19

## 2024-01-19 RX ORDER — SULFAMETHOXAZOLE AND TRIMETHOPRIM 800; 160 MG/1; MG/1
1 TABLET ORAL 2 TIMES DAILY
Qty: 6 TABLET | Refills: 0 | Status: SHIPPED | OUTPATIENT
Start: 2024-01-19 | End: 2024-01-22

## 2024-01-19 RX ORDER — ALBUTEROL SULFATE 2.5 MG/3ML
2.5 SOLUTION RESPIRATORY (INHALATION) EVERY 8 HOURS PRN
Status: DISCONTINUED | OUTPATIENT
Start: 2024-01-19 | End: 2024-01-19 | Stop reason: HOSPADM

## 2024-01-19 RX ORDER — TAMSULOSIN HYDROCHLORIDE 0.4 MG/1
0.4 CAPSULE ORAL DAILY
Qty: 5 CAPSULE | Refills: 0 | Status: SHIPPED | OUTPATIENT
Start: 2024-01-19

## 2024-01-19 RX ORDER — FENTANYL CITRATE 50 UG/ML
50 INJECTION, SOLUTION INTRAMUSCULAR; INTRAVENOUS EVERY 5 MIN PRN
Status: CANCELLED | OUTPATIENT
Start: 2024-01-19

## 2024-01-19 RX ORDER — LIDOCAINE HYDROCHLORIDE 10 MG/ML
INJECTION, SOLUTION EPIDURAL; INFILTRATION; INTRACAUDAL; PERINEURAL PRN
Status: DISCONTINUED | OUTPATIENT
Start: 2024-01-19 | End: 2024-01-19 | Stop reason: SDUPTHER

## 2024-01-19 RX ORDER — ONDANSETRON 2 MG/ML
INJECTION INTRAMUSCULAR; INTRAVENOUS PRN
Status: DISCONTINUED | OUTPATIENT
Start: 2024-01-19 | End: 2024-01-19 | Stop reason: SDUPTHER

## 2024-01-19 RX ORDER — SODIUM CHLORIDE 0.9 % (FLUSH) 0.9 %
5-40 SYRINGE (ML) INJECTION PRN
Status: CANCELLED | OUTPATIENT
Start: 2024-01-19

## 2024-01-19 RX ORDER — SODIUM CHLORIDE 9 MG/ML
INJECTION, SOLUTION INTRAVENOUS PRN
Status: CANCELLED | OUTPATIENT
Start: 2024-01-19

## 2024-01-19 RX ORDER — DIPHENHYDRAMINE HYDROCHLORIDE 50 MG/ML
12.5 INJECTION INTRAMUSCULAR; INTRAVENOUS
Status: CANCELLED | OUTPATIENT
Start: 2024-01-19 | End: 2024-01-20

## 2024-01-19 RX ORDER — OXYCODONE HYDROCHLORIDE 5 MG/1
5 TABLET ORAL ONCE
Status: COMPLETED | OUTPATIENT
Start: 2024-01-19 | End: 2024-01-19

## 2024-01-19 RX ORDER — SCOLOPAMINE TRANSDERMAL SYSTEM 1 MG/1
1 PATCH, EXTENDED RELEASE TRANSDERMAL ONCE
Status: DISCONTINUED | OUTPATIENT
Start: 2024-01-19 | End: 2024-01-19 | Stop reason: HOSPADM

## 2024-01-19 RX ORDER — MAGNESIUM HYDROXIDE 1200 MG/15ML
LIQUID ORAL CONTINUOUS PRN
Status: DISCONTINUED | OUTPATIENT
Start: 2024-01-19 | End: 2024-01-19 | Stop reason: HOSPADM

## 2024-01-19 RX ORDER — KETOROLAC TROMETHAMINE 30 MG/ML
INJECTION, SOLUTION INTRAMUSCULAR; INTRAVENOUS PRN
Status: DISCONTINUED | OUTPATIENT
Start: 2024-01-19 | End: 2024-01-19 | Stop reason: SDUPTHER

## 2024-01-19 RX ORDER — ALBUTEROL SULFATE 90 UG/1
2 AEROSOL, METERED RESPIRATORY (INHALATION) EVERY 6 HOURS PRN
Status: DISCONTINUED | OUTPATIENT
Start: 2024-01-19 | End: 2024-01-19 | Stop reason: HOSPADM

## 2024-01-19 RX ORDER — OXYCODONE HYDROCHLORIDE 5 MG/1
5 TABLET ORAL EVERY 6 HOURS PRN
Qty: 7 TABLET | Refills: 0 | Status: SHIPPED | OUTPATIENT
Start: 2024-01-19 | End: 2024-01-22

## 2024-01-19 RX ORDER — MIDAZOLAM HYDROCHLORIDE 2 MG/2ML
1 INJECTION, SOLUTION INTRAMUSCULAR; INTRAVENOUS EVERY 10 MIN PRN
Status: DISCONTINUED | OUTPATIENT
Start: 2024-01-19 | End: 2024-01-19 | Stop reason: HOSPADM

## 2024-01-19 RX ORDER — PROPOFOL 10 MG/ML
INJECTION, EMULSION INTRAVENOUS PRN
Status: DISCONTINUED | OUTPATIENT
Start: 2024-01-19 | End: 2024-01-19 | Stop reason: SDUPTHER

## 2024-01-19 RX ORDER — FENTANYL CITRATE 50 UG/ML
25 INJECTION, SOLUTION INTRAMUSCULAR; INTRAVENOUS EVERY 5 MIN PRN
Status: CANCELLED | OUTPATIENT
Start: 2024-01-19

## 2024-01-19 RX ADMIN — FENTANYL CITRATE 50 MCG: 50 INJECTION, SOLUTION INTRAMUSCULAR; INTRAVENOUS at 11:30

## 2024-01-19 RX ADMIN — Medication 2000 MG: at 11:21

## 2024-01-19 RX ADMIN — LIDOCAINE HYDROCHLORIDE 50 MG: 10 INJECTION, SOLUTION EPIDURAL; INFILTRATION; INTRACAUDAL; PERINEURAL at 11:12

## 2024-01-19 RX ADMIN — FENTANYL CITRATE 50 MCG: 50 INJECTION, SOLUTION INTRAMUSCULAR; INTRAVENOUS at 11:12

## 2024-01-19 RX ADMIN — OXYCODONE HYDROCHLORIDE 5 MG: 5 TABLET ORAL at 13:00

## 2024-01-19 RX ADMIN — APREPITANT 40 MG: 40 CAPSULE ORAL at 10:16

## 2024-01-19 RX ADMIN — PROPOFOL 200 MG: 10 INJECTION, EMULSION INTRAVENOUS at 11:12

## 2024-01-19 RX ADMIN — MIDAZOLAM 2 MG: 1 INJECTION INTRAMUSCULAR; INTRAVENOUS at 11:07

## 2024-01-19 RX ADMIN — SODIUM CHLORIDE, POTASSIUM CHLORIDE, SODIUM LACTATE AND CALCIUM CHLORIDE: 600; 310; 30; 20 INJECTION, SOLUTION INTRAVENOUS at 09:55

## 2024-01-19 RX ADMIN — ONDANSETRON 4 MG: 2 INJECTION INTRAMUSCULAR; INTRAVENOUS at 11:23

## 2024-01-19 RX ADMIN — DEXAMETHASONE SODIUM PHOSPHATE 10 MG: 10 INJECTION INTRAMUSCULAR; INTRAVENOUS at 11:23

## 2024-01-19 RX ADMIN — KETOROLAC TROMETHAMINE 30 MG: 30 INJECTION, SOLUTION INTRAMUSCULAR; INTRAVENOUS at 11:36

## 2024-01-19 ASSESSMENT — PAIN DESCRIPTION - DESCRIPTORS: DESCRIPTORS: BURNING;DISCOMFORT

## 2024-01-19 ASSESSMENT — ENCOUNTER SYMPTOMS: SHORTNESS OF BREATH: 0

## 2024-01-19 ASSESSMENT — PAIN - FUNCTIONAL ASSESSMENT
PAIN_FUNCTIONAL_ASSESSMENT: ACTIVITIES ARE NOT PREVENTED
PAIN_FUNCTIONAL_ASSESSMENT: 0-10

## 2024-01-19 NOTE — ANESTHESIA PRE PROCEDURE
Department of Anesthesiology  Preprocedure Note       Name:  Sherrie Richards   Age:  28 y.o.  :  1995                                          MRN:  [id number]         Date:  2024      Surgeon: Surgeon(s):  Kenneth Barrera MD    Procedure: Procedure(s):  HOLMIUM LASER, CYSTOSCOPY, RIGHT URETEROSCOPY, RIGHT STENT EXCHANGE    Medications prior to admission:   Prior to Admission medications    Medication Sig Start Date End Date Taking? Authorizing Provider   cephALEXin (KEFLEX) 500 MG capsule Take 1 capsule by mouth 4 times daily for 7 days 24  Hermelinda Bacon MD   acetaminophen (TYLENOL) 500 MG tablet Take 1 tablet by mouth every 6 hours as needed  Patient not taking: Reported on 2024   Yumiko Francois MD   FLOVENT  MCG/ACT inhaler Inhale 2 puffs into the lungs 2 times daily 23   Yumiko Francois MD   tamsulosin (FLOMAX) 0.4 MG capsule Take 1 capsule by mouth daily    Yumiko Francois MD   albuterol (PROAIR HFA) 108 (90 BASE) MCG/ACT inhaler Inhale 2 puffs into the lungs every 6 hours as needed    ProviderYumiko MD       Current medications:    No current facility-administered medications for this encounter.       Allergies:  No Known Allergies    Problem List:    Patient Active Problem List   Diagnosis Code    Asthma J45.909    Pregnancy complicated by coagulation defect (HCC) O99.119, D68.9    SOB (shortness of breath) R06.02    Chest pain R07.9    Obstructive uropathy N13.9    Kidney stone N20.0       Past Medical History:        Diagnosis Date    Asthma     COVID-2021    watery eyes X 7 days. No treatment or hospitalization.    Kidney stone     MTHFR (methylene THF reductase) deficiency and homocystinuria (HCC)     UTI (urinary tract infection)     Wellness examination     PCP Brielle Hayward CNP/faviola/ last see 24.       Past Surgical History:        Procedure Laterality Date    ADENOIDECTOMY       SECTION      X 2

## 2024-01-19 NOTE — H&P
History and Physical    Patient:  Sherrie Richards  MRN: [id number]  YOB: 1995    CHIEF COMPLAINT:  Right ureteral stones    HISTORY OF PRESENT ILLNESS:   The patient is a 28 y.o. female who presents with right ureteral stones s/p cystoscopy, right ureteroscopy with holmium laser lithotripsy, stent placement with Dr. Nicolas on 2024. Patient presents for second look ureteroscopy today    Past Medical History:    Past Medical History:   Diagnosis Date    Asthma     COVID-2021    watery eyes X 7 days. No treatment or hospitalization.    Kidney stone     MTHFR (methylene THF reductase) deficiency and homocystinuria (HCC)     UTI (urinary tract infection)     Wellness examination     PCP Brielle Hayward CNP/faviola/ last see 24.       Past Surgical History:    Past Surgical History:   Procedure Laterality Date    ADENOIDECTOMY       SECTION      X 2    CYSTOSCOPY Right 2024    CYSTOSCOPY, URETEROSCOPY, HOLMIUM LASER, STENT PLACEMENT RIGHT performed by Mau Nicolas MD at [facility] OR    TONSILLECTOMY         Medications Prior to Admission:    Prior to Admission medications    Medication Sig Start Date End Date Taking? Authorizing Provider   cephALEXin (KEFLEX) 500 MG capsule Take 1 capsule by mouth 4 times daily for 7 days 24  Hermelinda Bacon MD   acetaminophen (TYLENOL) 500 MG tablet Take 1 tablet by mouth every 6 hours as needed  Patient not taking: Reported on 2024   Yumiko Francois MD   FLOVENT  MCG/ACT inhaler Inhale 2 puffs into the lungs 2 times daily 23   Yumiko Francois MD   tamsulosin (FLOMAX) 0.4 MG capsule Take 1 capsule by mouth daily    Yumiko Francois MD   albuterol (PROAIR HFA) 108 (90 BASE) MCG/ACT inhaler Inhale 2 puffs into the lungs every 6 hours as needed    Yumiko Francois MD       Allergies:  Patient has no known allergies.    Social History:    Social History     Socioeconomic History    Marital status:  and time.    Psych: Mood and affect normal.  Lungs: Clear to auscultation bilaterally. Respiratory effort normal  Cardiovascular: Normal S1 and S2. Normal peripheral pulses. Regular rate.  Abdomen: Soft, non-tender, non-distended        LABS:   No results for input(s): \"WBC\", \"HGB\", \"HCT\", \"MCV\", \"PLT\" in the last 72 hours.  No results for input(s): \"NA\", \"K\", \"CL\", \"CO2\", \"PHOS\", \"BUN\", \"CREATININE\", \"CA\" in the last 72 hours.  No results found for: \"PSA\"    Additional Lab/culture results:    Urinalysis: No results for input(s): \"COLORU\", \"PHUR\", \"LABCAST\", \"WBCUA\", \"RBCUA\", \"MUCUS\", \"TRICHOMONAS\", \"YEAST\", \"BACTERIA\", \"CLARITYU\", \"SPECGRAV\", \"LEUKOCYTESUR\", \"UROBILINOGEN\", \"BILIRUBINUR\", \"BLOODU\" in the last 72 hours.    Invalid input(s): \"NITRATE\", \"GLUCOSEUKETONESUAMORPHOUS\"     -----------------------------------------------------------------  Imaging Results:    Assessment and Plan   Impression:    28 y.o. female with right ureteral stones s/p right URS/HLL/stent placement on 1/12/2024    Plan:   OR today for cystoscopy, right ureteroscopy with holmium laser lithotripsy, right ureteral stent exchange.    Javier Lynn MD  9:20 AM 1/19/2024

## 2024-01-19 NOTE — ANESTHESIA POSTPROCEDURE EVALUATION
Department of Anesthesiology  Postprocedure Note    Patient: Sherrie Richrads  MRN: [id number]  YOB: 1995  Date of evaluation: 1/19/2024    Procedure Summary       Date: 01/19/24 Room / Location: [address]    Anesthesia Start: 1107 Anesthesia Stop: 1153    Procedure: HOLMIUM LASER STANDBY, CYSTOSCOPY, RIGHT URETEROSCOPY, RIGHT STENT EXCHANGE, STONE BASKETING Diagnosis:       Kidney stone      (Kidney stone [N20.0])    Surgeons: Kenneth Barrera MD Responsible Provider: Demetrio Snyder MD    Anesthesia Type: general ASA Status: 2            Anesthesia Type: No value filed.    Allison Phase I: Allison Score: 10    Allison Phase II:      Anesthesia Post Evaluation    Patient location during evaluation: PACU  Patient participation: complete - patient participated  Level of consciousness: awake  Pain score: 1  Airway patency: patent  Nausea & Vomiting: no nausea and no vomiting  Cardiovascular status: blood pressure returned to baseline and hemodynamically stable  Respiratory status: acceptable  Hydration status: euvolemic  Pain management: adequate    No notable events documented.

## 2024-01-19 NOTE — OP NOTE
Operative Note      Patient: Sherrie Richards  YOB: 1995  MRN: [id number]    Date of Procedure: 1/19/2024    Pre-Op Diagnosis Codes:     * Kidney stone [N20.0]    Post-Op Diagnosis: Same       Procedure(s):  Cystourethroscopy  Right ureteroscopy with stone basketing  Right ureteral stent exchange    Surgeon(s):  Kenneth Barrera MD    Assistant:   Javier Lynn MD PGY-4  Jay Ross MD PGY-5    Anesthesia: General    Estimated Blood Loss (mL): Minimal    Complications: None    Specimens:   * No specimens in log *    Implants:  Implant Name Type Inv. Item Serial No.  Lot No. LRB No. Used Action   STENT URET 5FR L24CM PERCFLX HYDR+ DBL PGTL THRD 2 - [id number] Stent STENT URET 5FR L24CM PERCFLX HYDR+ DBL PGTL THRD 2  [employer] [id number] Right 1 Explanted   STENT URET 4.8FR L24CM PERCFLX + HYDR+ FIRM DUROMETER DBL - [id number]  STENT URET 4.8FR L24CM PERCFLX + HYDR+ FIRM DUROMETER DBL  [employer]- [id number] Right 1 Implanted         Drains: * No LDAs found *    Findings:   Right ureteropyeloscopy: 2 mm right renal stone in lower pole    Indications: Patient is a 28 year old female with right ureteral stones who underwent right ureteroscopy with holmium laser lithotripsy on 1/12/2024. Patient presents for second look ureteroscopy. Patient was explained the risks and benefits of the procedure and she elected to proceed. Informed consent was obtained.      Narrative of the Procedure:    After informed consent was obtained in the preoperative area, the patient was taken back to the operating room and transferred to the operating table in supine position.  EPC cuffs were placed. The machine was turned on.  Anesthesia was induced and antibiotics were given.  The patient was placed in modified dorsal lithotomy position and sterilely prepped and draped in a standard fashion.  A timeout occurred.  Two patient identifiers were used.      We entered the urethra with a 22

## 2024-01-19 NOTE — DISCHARGE INSTRUCTIONS
Ureteroscopy Discharge Instructions:    Take prescriptions as directed.  No driving while taking narcotic pain medication.  Please call attending physician or hospital  with questions.  Please call or present to ED for fever >101 F, intractable nausea and vomiting, or uncontrolled pain.  OK to shower after discharge.  You may have a string taped to your pelvis, genitalia, or inner thigh. Please take care while showering/wiping that you do not tug on the string and dislodge your indwelling stent early.  OK to remove stent by pulling the string straight out in 3 days. Please call the office if you have difficulty removing your stent  You may see blood in your urine the entire time your stent is in place. This is normal.   Follow up with Dr. Barrera in 6 weeks with renal ultrasound prior to appointment. Call office to confirm appointment.

## 2024-01-24 DIAGNOSIS — N13.30 HYDRONEPHROSIS OF RIGHT KIDNEY: Primary | ICD-10-CM

## 2024-01-26 ENCOUNTER — TELEPHONE (OUTPATIENT)
Dept: UROLOGY | Age: 29
End: 2024-01-26

## 2024-01-26 NOTE — TELEPHONE ENCOUNTER
Patient called in stating \"I have been throwing up since last week. Every time I eat it comes right back up. I thought it was from taking the flomax. But, I stopped that a few days ago and I still throw up. I keep hydrated by drinking lots of water. Im not sure what could be the cause of this, if  recommends anything?\"     Writer will get a message out to  and follow up with patient. Per  patient is to go to the ER.

## 2024-02-09 ENCOUNTER — HOSPITAL ENCOUNTER (OUTPATIENT)
Dept: ULTRASOUND IMAGING | Age: 29
Discharge: HOME | End: 2024-02-09
Attending: UROLOGY
Payer: COMMERCIAL

## 2024-02-09 DIAGNOSIS — N13.30 HYDRONEPHROSIS OF RIGHT KIDNEY: ICD-10-CM

## 2024-02-09 PROCEDURE — 76770 US EXAM ABDO BACK WALL COMP: CPT

## 2024-02-21 ENCOUNTER — TELEPHONE (OUTPATIENT)
Dept: UROLOGY | Age: 29
End: 2024-02-21

## 2024-02-26 ENCOUNTER — HOSPITAL ENCOUNTER (OUTPATIENT)
Age: 29
Setting detail: SPECIMEN
Discharge: HOME | End: 2024-02-26

## 2024-02-26 ENCOUNTER — OFFICE VISIT (OUTPATIENT)
Dept: UROLOGY | Age: 29
End: 2024-02-26
Payer: COMMERCIAL

## 2024-02-26 VITALS
OXYGEN SATURATION: 98 % | SYSTOLIC BLOOD PRESSURE: 123 MMHG | HEIGHT: 60 IN | HEART RATE: 89 BPM | DIASTOLIC BLOOD PRESSURE: 87 MMHG | WEIGHT: 162 LBS | TEMPERATURE: 97.4 F | BODY MASS INDEX: 31.8 KG/M2

## 2024-02-26 DIAGNOSIS — Z87.442 HISTORY OF KIDNEY STONES: Primary | ICD-10-CM

## 2024-02-26 PROCEDURE — 99214 OFFICE O/P EST MOD 30 MIN: CPT | Performed by: NURSE PRACTITIONER

## 2024-02-26 ASSESSMENT — ENCOUNTER SYMPTOMS
NAUSEA: 0
COUGH: 0
SHORTNESS OF BREATH: 0
VOMITING: 0
WHEEZING: 0
EYE PAIN: 0
DIARRHEA: 0
BACK PAIN: 0
EYE REDNESS: 0
CONSTIPATION: 0
ABDOMINAL PAIN: 0

## 2024-02-26 NOTE — PROGRESS NOTES
[facility] PHYSICIANS [facility] UROLOGY CENTER  [address]  [facility] [address]  Dept: [phone]    [facility] Urology Office Note - Established    Patient:  Sherrie Richards  YOB: 1995  Date: 2/26/2024    The patient is a 28 y.o. female whopresents today for evaluation of the following problems:   Chief Complaint   Patient presents with    Other     6 Week Renal us        HPI  Patient is a 27 yo female with kidney stones presenting for follow up.   She underwent R URS HLL 1/12/24 with second look URS 1/19/2024.  Found to have a 2mm R renal stone.   She pulled stent at home, she did pass a small stone which she brought today.  Since passing the stone, she has had signficant improvement in urinary frequency and urgency.   She denies hematuria, dysuria, flank pain, fever/chills.   Repeat renal us was negative.   Denies any other  symptoms today.    Summary of old records: N/A    Additional History: N/A    Procedures Today: N/A    Urinalysis today:  No results found for this visit on 02/26/24.    Imaging Reviewed during this Office Visit: none    US RENAL COMPLETE  Order: [id number]  Status: Final result       Visible to patient: Yes (seen)       Next appt: 08/19/2024 at 01:40 PM in Urology (Kenneth Barrera MD)       Dx: Hydronephrosis of right kidney    1 Result Note  Details    Reading Physician Reading Date Result Priority   Darío Alcantara DO  [phone] 2/9/2024      Narrative & Impression  EXAMINATION:  RETROPERITONEAL ULTRASOUND OF THE KIDNEYS AND URINARY BLADDER     2/9/2024     COMPARISON:  None     HISTORY:  ORDERING SYSTEM PROVIDED HISTORY: Hydronephrosis of right kidney  TECHNOLOGIST PROVIDED HISTORY:  This procedure can be scheduled via [employer].  Access your [employer] account by  visiting [email or web address].  Hydronephrosis of right kidney     FINDINGS:     Kidneys:     The right kidney measures 11.5 cm in length and the left kidney

## 2024-02-27 DIAGNOSIS — Z87.442 HISTORY OF KIDNEY STONES: ICD-10-CM

## 2024-03-01 LAB
STONE COMPOSITION: NORMAL
STONE DESCRIPTION: NORMAL
STONE MASS: 12 MG

## 2024-03-06 ENCOUNTER — TRANSCRIBE ORDERS (OUTPATIENT)
Dept: ADMINISTRATIVE | Age: 29
End: 2024-03-06

## 2024-03-06 DIAGNOSIS — N92.6 IRREGULAR MENSTRUAL CYCLE: Primary | ICD-10-CM

## 2024-03-07 ENCOUNTER — HOSPITAL ENCOUNTER (OUTPATIENT)
Dept: ULTRASOUND IMAGING | Age: 29
Discharge: HOME | End: 2024-03-09
Payer: COMMERCIAL

## 2024-03-07 DIAGNOSIS — N92.6 IRREGULAR MENSTRUAL CYCLE: ICD-10-CM

## 2024-03-07 PROCEDURE — 76830 TRANSVAGINAL US NON-OB: CPT

## 2024-03-11 ENCOUNTER — HOSPITAL ENCOUNTER (EMERGENCY)
Age: 29
Discharge: HOME | End: 2024-03-11
Attending: STUDENT IN AN ORGANIZED HEALTH CARE EDUCATION/TRAINING PROGRAM
Payer: COMMERCIAL

## 2024-03-11 ENCOUNTER — TELEPHONE (OUTPATIENT)
Dept: UROLOGY | Age: 29
End: 2024-03-11

## 2024-03-11 VITALS
HEIGHT: 60 IN | TEMPERATURE: 97.2 F | WEIGHT: 160 LBS | RESPIRATION RATE: 19 BRPM | OXYGEN SATURATION: 98 % | BODY MASS INDEX: 31.41 KG/M2 | HEART RATE: 91 BPM | SYSTOLIC BLOOD PRESSURE: 128 MMHG | DIASTOLIC BLOOD PRESSURE: 89 MMHG

## 2024-03-11 DIAGNOSIS — N20.1 URETEROLITHIASIS: ICD-10-CM

## 2024-03-11 DIAGNOSIS — R10.9 RIGHT FLANK PAIN: Primary | ICD-10-CM

## 2024-03-11 LAB
ALBUMIN SERPL-MCNC: 4.9 G/DL (ref 3.5–5.2)
ALP SERPL-CCNC: 54 U/L (ref 35–104)
ALT SERPL-CCNC: 55 U/L (ref 5–33)
ANION GAP SERPL CALCULATED.3IONS-SCNC: 14 MMOL/L (ref 9–17)
AST SERPL-CCNC: 36 U/L
BACTERIA URNS QL MICRO: ABNORMAL
BASOPHILS # BLD: 0 K/UL (ref 0–0.2)
BASOPHILS NFR BLD: 0 % (ref 0–2)
BILIRUB DIRECT SERPL-MCNC: 0.1 MG/DL
BILIRUB INDIRECT SERPL-MCNC: 0.3 MG/DL (ref 0–1)
BILIRUB SERPL-MCNC: 0.4 MG/DL (ref 0.3–1.2)
BILIRUB UR QL STRIP: NEGATIVE
BUN SERPL-MCNC: 10 MG/DL (ref 6–20)
CALCIUM SERPL-MCNC: 9.6 MG/DL (ref 8.6–10.4)
CASTS #/AREA URNS LPF: ABNORMAL /LPF
CHLORIDE SERPL-SCNC: 100 MMOL/L (ref 98–107)
CLARITY UR: ABNORMAL
CO2 SERPL-SCNC: 23 MMOL/L (ref 20–31)
COLOR UR: YELLOW
CREAT SERPL-MCNC: 0.9 MG/DL (ref 0.5–0.9)
EOSINOPHIL # BLD: 0.1 K/UL (ref 0–0.4)
EOSINOPHILS RELATIVE PERCENT: 1 % (ref 0–4)
EPI CELLS #/AREA URNS HPF: ABNORMAL /HPF
ERYTHROCYTE [DISTWIDTH] IN BLOOD BY AUTOMATED COUNT: 12.6 % (ref 11.5–14.9)
GFR SERPL CREATININE-BSD FRML MDRD: >60 ML/MIN/1.73M2
GLUCOSE SERPL-MCNC: 122 MG/DL (ref 70–99)
GLUCOSE UR STRIP-MCNC: NEGATIVE MG/DL
HCG SERPL QL: NEGATIVE
HCT VFR BLD AUTO: 42.2 % (ref 36–46)
HGB BLD-MCNC: 13.8 G/DL (ref 12–16)
HGB UR QL STRIP.AUTO: ABNORMAL
KETONES UR STRIP-MCNC: NEGATIVE MG/DL
LACTATE BLDV-SCNC: 2.4 MMOL/L (ref 0.5–2.2)
LEUKOCYTE ESTERASE UR QL STRIP: ABNORMAL
LIPASE SERPL-CCNC: 19 U/L (ref 13–60)
LYMPHOCYTES NFR BLD: 1.3 K/UL (ref 1–4.8)
LYMPHOCYTES RELATIVE PERCENT: 10 % (ref 24–44)
MAGNESIUM SERPL-MCNC: 1.5 MG/DL (ref 1.6–2.6)
MCH RBC QN AUTO: 29.5 PG (ref 26–34)
MCHC RBC AUTO-ENTMCNC: 32.8 G/DL (ref 31–37)
MCV RBC AUTO: 89.9 FL (ref 80–100)
MONOCYTES NFR BLD: 0.8 K/UL (ref 0.1–1.3)
MONOCYTES NFR BLD: 6 % (ref 1–7)
NEUTROPHILS NFR BLD: 83 % (ref 36–66)
NEUTS SEG NFR BLD: 10.8 K/UL (ref 1.3–9.1)
NITRITE UR QL STRIP: NEGATIVE
PH UR STRIP: 5 [PH] (ref 5–8)
PLATELET # BLD AUTO: 329 K/UL (ref 150–450)
PMV BLD AUTO: 8.4 FL (ref 6–12)
POTASSIUM SERPL-SCNC: 4 MMOL/L (ref 3.7–5.3)
PROT SERPL-MCNC: 7.8 G/DL (ref 6.4–8.3)
PROT UR STRIP-MCNC: NEGATIVE MG/DL
RBC # BLD AUTO: 4.69 M/UL (ref 4–5.2)
RBC #/AREA URNS HPF: ABNORMAL /HPF
SODIUM SERPL-SCNC: 137 MMOL/L (ref 135–144)
SP GR UR STRIP: 1.02 (ref 1–1.03)
UROBILINOGEN UR STRIP-ACNC: NORMAL EU/DL (ref 0–1)
WBC #/AREA URNS HPF: ABNORMAL /HPF
WBC OTHER # BLD: 13 K/UL (ref 3.5–11)

## 2024-03-11 PROCEDURE — 96375 TX/PRO/DX INJ NEW DRUG ADDON: CPT

## 2024-03-11 PROCEDURE — 84703 CHORIONIC GONADOTROPIN ASSAY: CPT

## 2024-03-11 PROCEDURE — 6360000002 HC RX W HCPCS: Performed by: STUDENT IN AN ORGANIZED HEALTH CARE EDUCATION/TRAINING PROGRAM

## 2024-03-11 PROCEDURE — 96374 THER/PROPH/DIAG INJ IV PUSH: CPT

## 2024-03-11 PROCEDURE — 83605 ASSAY OF LACTIC ACID: CPT

## 2024-03-11 PROCEDURE — 36415 COLL VENOUS BLD VENIPUNCTURE: CPT

## 2024-03-11 PROCEDURE — 80048 BASIC METABOLIC PNL TOTAL CA: CPT

## 2024-03-11 PROCEDURE — 2580000003 HC RX 258: Performed by: STUDENT IN AN ORGANIZED HEALTH CARE EDUCATION/TRAINING PROGRAM

## 2024-03-11 PROCEDURE — 85025 COMPLETE CBC W/AUTO DIFF WBC: CPT

## 2024-03-11 PROCEDURE — 83690 ASSAY OF LIPASE: CPT

## 2024-03-11 PROCEDURE — 6370000000 HC RX 637 (ALT 250 FOR IP): Performed by: STUDENT IN AN ORGANIZED HEALTH CARE EDUCATION/TRAINING PROGRAM

## 2024-03-11 PROCEDURE — 83735 ASSAY OF MAGNESIUM: CPT

## 2024-03-11 PROCEDURE — 80076 HEPATIC FUNCTION PANEL: CPT

## 2024-03-11 PROCEDURE — 99284 EMERGENCY DEPT VISIT MOD MDM: CPT

## 2024-03-11 PROCEDURE — 81001 URINALYSIS AUTO W/SCOPE: CPT

## 2024-03-11 RX ORDER — KETOROLAC TROMETHAMINE 30 MG/ML
30 INJECTION, SOLUTION INTRAMUSCULAR; INTRAVENOUS ONCE
Status: COMPLETED | OUTPATIENT
Start: 2024-03-11 | End: 2024-03-11

## 2024-03-11 RX ORDER — TAMSULOSIN HYDROCHLORIDE 0.4 MG/1
0.4 CAPSULE ORAL DAILY
Qty: 10 CAPSULE | Refills: 0 | Status: SHIPPED | OUTPATIENT
Start: 2024-03-11

## 2024-03-11 RX ORDER — NAPROXEN 500 MG/1
500 TABLET ORAL 2 TIMES DAILY WITH MEALS
Qty: 14 TABLET | Refills: 0 | Status: SHIPPED | OUTPATIENT
Start: 2024-03-11

## 2024-03-11 RX ORDER — ONDANSETRON 2 MG/ML
4 INJECTION INTRAMUSCULAR; INTRAVENOUS ONCE
Status: COMPLETED | OUTPATIENT
Start: 2024-03-11 | End: 2024-03-11

## 2024-03-11 RX ORDER — ONDANSETRON 4 MG/1
4 TABLET, ORALLY DISINTEGRATING ORAL 3 TIMES DAILY PRN
Qty: 9 TABLET | Refills: 0 | Status: SHIPPED | OUTPATIENT
Start: 2024-03-11

## 2024-03-11 RX ORDER — ACETAMINOPHEN 500 MG
1000 TABLET ORAL EVERY 8 HOURS PRN
Qty: 30 TABLET | Refills: 0 | Status: SHIPPED | OUTPATIENT
Start: 2024-03-11

## 2024-03-11 RX ORDER — 0.9 % SODIUM CHLORIDE 0.9 %
1000 INTRAVENOUS SOLUTION INTRAVENOUS ONCE
Status: COMPLETED | OUTPATIENT
Start: 2024-03-11 | End: 2024-03-11

## 2024-03-11 RX ORDER — ONDANSETRON 4 MG/1
4 TABLET, ORALLY DISINTEGRATING ORAL ONCE
Status: COMPLETED | OUTPATIENT
Start: 2024-03-11 | End: 2024-03-11

## 2024-03-11 RX ORDER — LANOLIN ALCOHOL/MO/W.PET/CERES
400 CREAM (GRAM) TOPICAL ONCE
Status: COMPLETED | OUTPATIENT
Start: 2024-03-11 | End: 2024-03-11

## 2024-03-11 RX ORDER — MORPHINE SULFATE 10 MG/ML
5 INJECTION, SOLUTION INTRAMUSCULAR; INTRAVENOUS ONCE
Status: COMPLETED | OUTPATIENT
Start: 2024-03-11 | End: 2024-03-11

## 2024-03-11 RX ADMIN — SODIUM CHLORIDE 1000 ML: 9 INJECTION, SOLUTION INTRAVENOUS at 18:59

## 2024-03-11 RX ADMIN — ONDANSETRON 4 MG: 4 TABLET, ORALLY DISINTEGRATING ORAL at 21:06

## 2024-03-11 RX ADMIN — KETOROLAC TROMETHAMINE 30 MG: 30 INJECTION, SOLUTION INTRAMUSCULAR at 20:06

## 2024-03-11 RX ADMIN — MORPHINE SULFATE 5 MG: 10 INJECTION, SOLUTION INTRAMUSCULAR; INTRAVENOUS at 19:11

## 2024-03-11 RX ADMIN — ONDANSETRON 4 MG: 2 INJECTION INTRAMUSCULAR; INTRAVENOUS at 19:12

## 2024-03-11 RX ADMIN — Medication 400 MG: at 20:45

## 2024-03-11 ASSESSMENT — PAIN SCALES - GENERAL
PAINLEVEL_OUTOF10: 8
PAINLEVEL_OUTOF10: 10
PAINLEVEL_OUTOF10: 3

## 2024-03-11 ASSESSMENT — PAIN - FUNCTIONAL ASSESSMENT
PAIN_FUNCTIONAL_ASSESSMENT: 0-10
PAIN_FUNCTIONAL_ASSESSMENT: PREVENTS OR INTERFERES SOME ACTIVE ACTIVITIES AND ADLS

## 2024-03-11 ASSESSMENT — PAIN DESCRIPTION - ORIENTATION
ORIENTATION: RIGHT
ORIENTATION: RIGHT

## 2024-03-11 ASSESSMENT — PAIN DESCRIPTION - LOCATION
LOCATION: FLANK

## 2024-03-11 ASSESSMENT — PAIN DESCRIPTION - DESCRIPTORS
DESCRIPTORS: ACHING;STABBING
DESCRIPTORS: ACHING

## 2024-03-11 ASSESSMENT — ENCOUNTER SYMPTOMS
NAUSEA: 1
VOMITING: 0

## 2024-03-11 NOTE — ED PROVIDER NOTES
of nephrolithiasis and has been following with urology for this.  Pain seems to be improving after medications.  Urinalysis is contaminated, trace leukocyte esterase, moderate bacteria but 21-50 epithelial cells.  Lactic acid elevated 2.4.    Patient improved symptomatically, will plan on discharge with trial of passage.  Resident completed bedside ultrasound.      EKG Interpretation    Interpreted by me  none    Procedures:  See resident note         CRITICAL CARE: There was a high probability of clinically significant/life threatening deterioration in this patient's condition which required my urgent intervention.  Total critical care time was 10 minutes.  This excludes any time for separately reportable procedures.     Haim Tidwell DO  Attending Emergency Physician         Haim Tidwell DO  03/11/24 2007    
portions of this note were completed with a voice recognition program.  Efforts were made to edit the dictations but occasionally words are mis-transcribed.)

## 2024-03-11 NOTE — TELEPHONE ENCOUNTER
Patient called into the office, stated that she is having a lot of pain on her upper right side. Patient stated that her pain level is an 8, \"it feels like a punching pain, I took a pain pill at 2:38 PM today and it hasn't helped. Patient states that she doesn't have any blood in her urine and has been drinking water with lemon. Writer advised patient to go to the ER. Patient verbalized understanding, call was ended.

## 2024-03-12 NOTE — DISCHARGE INSTRUCTIONS
You have been seen in the emergency department today due to concern for flank pain.  This is likely related to a kidney stone.  You will be prescribed naproxen, Tylenol, Flomax.  Please take these as prescribed.  Strongly recommend that you follow-up with your urology team as soon as possible.  Their phone number has been attached in your paperwork.  If you have any recurrent symptoms, fevers, chills, pain with urination or any other concerning symptoms return to the emergency department immediately

## 2024-03-30 ENCOUNTER — NURSE TRIAGE (OUTPATIENT)
Dept: OTHER | Facility: CLINIC | Age: 29
End: 2024-03-30
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
## 2024-04-09 ENCOUNTER — OFFICE VISIT (OUTPATIENT)
Dept: OBGYN CLINIC | Age: 29
End: 2024-04-09
Payer: COMMERCIAL

## 2024-04-09 VITALS
SYSTOLIC BLOOD PRESSURE: 120 MMHG | DIASTOLIC BLOOD PRESSURE: 60 MMHG | WEIGHT: 160 LBS | HEIGHT: 60 IN | BODY MASS INDEX: 31.41 KG/M2

## 2024-04-09 DIAGNOSIS — R63.5 WEIGHT GAIN: ICD-10-CM

## 2024-04-09 DIAGNOSIS — N92.6 IRREGULAR PERIODS: Primary | ICD-10-CM

## 2024-04-09 DIAGNOSIS — Z98.891 HISTORY OF CESAREAN SECTION: ICD-10-CM

## 2024-04-09 DIAGNOSIS — Z82.49 FAMILY HISTORY OF DVT: ICD-10-CM

## 2024-04-09 DIAGNOSIS — Z83.2 FAMILY HISTORY OF FACTOR V DEFICIENCY: ICD-10-CM

## 2024-04-09 DIAGNOSIS — E28.2 PCOS (POLYCYSTIC OVARIAN SYNDROME): ICD-10-CM

## 2024-04-09 PROBLEM — I82.409 DEEP VEIN THROMBOSIS (HCC): Status: ACTIVE | Noted: 2024-04-09

## 2024-04-09 PROBLEM — O24.419 GESTATIONAL DIABETES: Status: ACTIVE | Noted: 2024-04-09

## 2024-04-09 PROBLEM — E72.11 MTHFR (METHYLENE THF REDUCTASE) DEFICIENCY AND HOMOCYSTINURIA (HCC): Status: ACTIVE | Noted: 2024-04-09

## 2024-04-09 PROBLEM — E72.12 MTHFR (METHYLENE THF REDUCTASE) DEFICIENCY AND HOMOCYSTINURIA (HCC): Status: ACTIVE | Noted: 2024-04-09

## 2024-04-09 PROBLEM — I82.409 DEEP VEIN THROMBOSIS (HCC): Status: RESOLVED | Noted: 2024-04-09 | Resolved: 2024-04-09

## 2024-04-09 PROBLEM — G43.909 MIGRAINE: Status: ACTIVE | Noted: 2024-04-09

## 2024-04-09 PROCEDURE — 99204 OFFICE O/P NEW MOD 45 MIN: CPT | Performed by: ADVANCED PRACTICE MIDWIFE

## 2024-04-09 ASSESSMENT — ENCOUNTER SYMPTOMS
NAUSEA: 0
ABDOMINAL PAIN: 0
SHORTNESS OF BREATH: 0
DIARRHEA: 0
VOMITING: 0

## 2024-04-09 NOTE — PROGRESS NOTES
[facility] OB/GYN [address]  [address]  [facility] [address]  Dept: [phone]    Patient Name: Sherrie Richards  Patient Age: 29 y.o.  Date of Visit: 2024    Subjective  Chief Complaint   Patient presents with    New Patient     Patient's last menstrual period was 2024.    HPI  Chaperone for Intimate Exam  Chaperone was offered as part of the rooming process. Patient declined and agrees to continue with exam without a chaperone.  Chaperone: n/a    Sherrie Richards arrives as a New  patient.     Sherrie Richards concern(s) today include history of irregular menses and difficulty with conceiving. Had first child at 18 reports took 6 years until their second and have not been preventing since her last was 9 months old. Last was born in 2019. Reports having irregular periods since starting to have periods. Has never been diagnosed with PCOS    Gyn Hx:  Emir does not have monthly menstrual cycles. Typically 1-3 periods a year    Age of menstruation: 15    Sherrie Richards does report a history of irregular menstrual cycles.     Sherrie Richards is sexually active with 1 male partner(s).     Emir is not protecting against a pregnancy. Has not been preventing    Sherrie Richards denies a history of sexually transmitted infections, none.    No family history of infertility     1st child at 18 years old   - GDM  2nd child took 5 years to conceive   - failed          No data to display              Interpretation of Total Score Depression Severity: 1-4 = Minimal depression, 5-9 = Mild depression, 10-14 = Moderate depression, 15-19 = Moderately severe depression, 20-27 = Severe depression      OB History          2    Para   2    Term   2            AB        Living   2         SAB        IAB        Ectopic        Molar        Multiple        Live Births   2              Past Medical History:   Diagnosis Date

## 2024-04-10 ENCOUNTER — HOSPITAL ENCOUNTER (OUTPATIENT)
Age: 29
Discharge: HOME | End: 2024-04-10
Payer: COMMERCIAL

## 2024-04-10 DIAGNOSIS — N92.6 IRREGULAR PERIODS: ICD-10-CM

## 2024-04-10 LAB
EST. AVERAGE GLUCOSE BLD GHB EST-MCNC: 111 MG/DL
HBA1C MFR BLD: 5.5 % (ref 4–6)
T4 FREE SERPL-MCNC: 1.4 NG/DL (ref 0.9–1.7)
TSH SERPL DL<=0.05 MIU/L-ACNC: 1.05 UIU/ML (ref 0.3–5)

## 2024-04-10 PROCEDURE — 84403 ASSAY OF TOTAL TESTOSTERONE: CPT

## 2024-04-10 PROCEDURE — 36415 COLL VENOUS BLD VENIPUNCTURE: CPT

## 2024-04-10 PROCEDURE — 84144 ASSAY OF PROGESTERONE: CPT

## 2024-04-10 PROCEDURE — 83525 ASSAY OF INSULIN: CPT

## 2024-04-10 PROCEDURE — 84443 ASSAY THYROID STIM HORMONE: CPT

## 2024-04-10 PROCEDURE — 84439 ASSAY OF FREE THYROXINE: CPT

## 2024-04-10 PROCEDURE — 83036 HEMOGLOBIN GLYCOSYLATED A1C: CPT

## 2024-04-10 PROCEDURE — 84270 ASSAY OF SEX HORMONE GLOBUL: CPT

## 2024-04-11 LAB
INSULIN COMMENT: NORMAL
INSULIN REFERENCE RANGE:: NORMAL
INSULIN: 11.5 MU/L
PROGEST SERPL-MCNC: 0.2 NG/ML
SHBG SERPL-SCNC: 22 NMOL/L (ref 25–122)
TESTOST FREE MFR SERPL: 13.5 PG/ML (ref 0.8–7.4)
TESTOST SERPL-MCNC: 60 NG/DL (ref 8–48)
TESTOSTERONE, BIOAVAILABLE: 31.6 NG/DL (ref 2.2–20.6)

## 2024-04-29 ENCOUNTER — HOSPITAL ENCOUNTER (OUTPATIENT)
Age: 29
Discharge: HOME | End: 2024-05-01
Payer: COMMERCIAL

## 2024-04-29 ENCOUNTER — HOSPITAL ENCOUNTER (OUTPATIENT)
Dept: GENERAL RADIOLOGY | Age: 29
Discharge: HOME | End: 2024-05-01
Payer: COMMERCIAL

## 2024-04-29 ENCOUNTER — OFFICE VISIT (OUTPATIENT)
Dept: UROLOGY | Age: 29
End: 2024-04-29
Payer: COMMERCIAL

## 2024-04-29 VITALS
TEMPERATURE: 98 F | DIASTOLIC BLOOD PRESSURE: 76 MMHG | HEIGHT: 60 IN | WEIGHT: 160 LBS | SYSTOLIC BLOOD PRESSURE: 120 MMHG | HEART RATE: 86 BPM | BODY MASS INDEX: 31.41 KG/M2 | OXYGEN SATURATION: 98 %

## 2024-04-29 DIAGNOSIS — Z87.442 HISTORY OF KIDNEY STONES: Primary | ICD-10-CM

## 2024-04-29 DIAGNOSIS — R10.9 RIGHT FLANK PAIN: ICD-10-CM

## 2024-04-29 DIAGNOSIS — Z87.442 HISTORY OF KIDNEY STONES: ICD-10-CM

## 2024-04-29 PROCEDURE — 74018 RADEX ABDOMEN 1 VIEW: CPT

## 2024-04-29 PROCEDURE — 99214 OFFICE O/P EST MOD 30 MIN: CPT | Performed by: UROLOGY

## 2024-04-29 ASSESSMENT — ENCOUNTER SYMPTOMS
EYE PAIN: 0
EYE REDNESS: 0
WHEEZING: 0
ABDOMINAL PAIN: 0
COUGH: 0
SHORTNESS OF BREATH: 0
GASTROINTESTINAL NEGATIVE: 1
VOMITING: 0
NAUSEA: 0
EYES NEGATIVE: 1
ALLERGIC/IMMUNOLOGIC NEGATIVE: 1
BACK PAIN: 0
RESPIRATORY NEGATIVE: 1

## 2024-04-29 NOTE — PROGRESS NOTES
[facility] PHYSICIANS [facility] UROLOGY CENTER  [address]  [facility] [address]  Dept: [phone]    [facility] Urology Office Note - Established    Patient:  Sherrie Richards  YOB: 1995  Date: 2024    The patient is a 29 y.o. female whopresents today for evaluation of the following problems:   Chief Complaint   Patient presents with    Stone       HPI  This is a very pleasant 29-year-old female with a history of stones.  She has had stone surgery earlier this year.  She is currently asymptomatic.  She the did have a recent KUB x-ray which shows a punctate left renal stone.She recently passed a stone.    Summary of old records: N/A    Additional History: N/A    Procedures Today: N/A    Urinalysis today:  No results found for this visit on 24.    Imaging Reviewed during this Office Visit: none  (results were independently reviewed by physician and radiology report verified)    AUA Symptom Score (2024):                               Last BUN and creatinine:  Lab Results   Component Value Date    BUN 10 2024     Lab Results   Component Value Date    CREATININE 0.9 2024       Additional Lab/Culture results: none    PAST MEDICAL, FAMILY AND SOCIAL HISTORY UPDATE:  Past Medical History:   Diagnosis Date    Asthma     COVID-2021    watery eyes X 7 days. No treatment or hospitalization.    Kidney stone     MTHFR (methylene THF reductase) deficiency and homocystinuria (HCC)     UTI (urinary tract infection)     Wellness examination     PCP Brielle Hayward CNP/faviola/ last see 24.     Past Surgical History:   Procedure Laterality Date    ADENOIDECTOMY       SECTION      X 2    CYSTOSCOPY Right 2024    CYSTOSCOPY, URETEROSCOPY, HOLMIUM LASER, STENT PLACEMENT RIGHT performed by Mau Nicolas MD at [facility] OR    CYSTOSCOPY Right 2024    HOLMIUM LASER STANDBY, CYSTOSCOPY, RIGHT URETEROSCOPY, RIGHT STENT

## 2024-06-06 ENCOUNTER — HOSPITAL ENCOUNTER (OUTPATIENT)
Age: 29
Discharge: HOME | End: 2024-06-06
Payer: COMMERCIAL

## 2024-06-06 DIAGNOSIS — Z87.442 HISTORY OF KIDNEY STONES: ICD-10-CM

## 2024-06-06 LAB
ANION GAP SERPL CALCULATED.3IONS-SCNC: 13 MMOL/L (ref 9–17)
BUN SERPL-MCNC: 6 MG/DL (ref 6–20)
CALCIUM SERPL-MCNC: 9.5 MG/DL (ref 8.6–10.4)
CHLORIDE SERPL-SCNC: 100 MMOL/L (ref 98–107)
CO2 SERPL-SCNC: 23 MMOL/L (ref 20–31)
CREAT SERPL-MCNC: 0.6 MG/DL (ref 0.5–0.9)
GFR, ESTIMATED: >90 ML/MIN/1.73M2
GLUCOSE SERPL-MCNC: 90 MG/DL (ref 70–99)
POTASSIUM SERPL-SCNC: 4.1 MMOL/L (ref 3.7–5.3)
PTH-INTACT SERPL-MCNC: 37 PG/ML (ref 15–65)
SODIUM SERPL-SCNC: 136 MMOL/L (ref 135–144)
URATE SERPL-MCNC: 5.8 MG/DL (ref 2.4–5.7)

## 2024-06-06 PROCEDURE — 80048 BASIC METABOLIC PNL TOTAL CA: CPT

## 2024-06-06 PROCEDURE — 83970 ASSAY OF PARATHORMONE: CPT

## 2024-06-06 PROCEDURE — 36415 COLL VENOUS BLD VENIPUNCTURE: CPT

## 2024-06-06 PROCEDURE — 84550 ASSAY OF BLOOD/URIC ACID: CPT

## 2024-06-10 ENCOUNTER — OFFICE VISIT (OUTPATIENT)
Dept: UROLOGY | Age: 29
End: 2024-06-10
Payer: COMMERCIAL

## 2024-06-10 VITALS
HEART RATE: 82 BPM | HEIGHT: 60 IN | SYSTOLIC BLOOD PRESSURE: 110 MMHG | BODY MASS INDEX: 31.41 KG/M2 | TEMPERATURE: 98 F | WEIGHT: 160 LBS | OXYGEN SATURATION: 98 % | DIASTOLIC BLOOD PRESSURE: 70 MMHG

## 2024-06-10 DIAGNOSIS — R82.994 HYPERCALCIURIA: ICD-10-CM

## 2024-06-10 DIAGNOSIS — Z87.442 HISTORY OF KIDNEY STONES: Primary | ICD-10-CM

## 2024-06-10 PROCEDURE — 99214 OFFICE O/P EST MOD 30 MIN: CPT | Performed by: UROLOGY

## 2024-06-10 RX ORDER — HYDROCHLOROTHIAZIDE 25 MG/1
25 TABLET ORAL EVERY MORNING
Qty: 90 TABLET | Refills: 1 | Status: SHIPPED | OUTPATIENT
Start: 2024-06-10

## 2024-06-10 ASSESSMENT — ENCOUNTER SYMPTOMS
EYE PAIN: 0
NAUSEA: 0
BACK PAIN: 0
RESPIRATORY NEGATIVE: 1
EYE REDNESS: 0
COUGH: 0
WHEEZING: 0
ALLERGIC/IMMUNOLOGIC NEGATIVE: 1
EYES NEGATIVE: 1
GASTROINTESTINAL NEGATIVE: 1
SHORTNESS OF BREATH: 0
ABDOMINAL PAIN: 0
VOMITING: 0

## 2024-06-10 NOTE — PROGRESS NOTES
Review of Systems   Constitutional: Negative.  Negative for activity change, chills and fever.   HENT: Negative.     Eyes: Negative.  Negative for pain, redness and visual disturbance.   Respiratory: Negative.  Negative for cough, shortness of breath and wheezing.    Cardiovascular: Negative.  Negative for chest pain and leg swelling.   Gastrointestinal: Negative.  Negative for abdominal pain, nausea and vomiting.   Endocrine: Negative.    Genitourinary: Negative.  Negative for difficulty urinating, dysuria, enuresis, flank pain, frequency, hematuria and urgency.   Musculoskeletal: Negative.  Negative for back pain, joint swelling and myalgias.   Skin: Negative.  Negative for rash and wound.   Allergic/Immunologic: Negative.    Neurological: Negative.  Negative for dizziness, tremors and numbness.   Hematological: Negative.  Does not bruise/bleed easily.   Psychiatric/Behavioral: Negative.       
and bmp  Assessment & Plan   Return in about 3 months (around 9/10/2024) for litholink and labs.    Prescriptions Ordered:  Orders Placed This Encounter   Medications    hydroCHLOROthiazide (HYDRODIURIL) 25 MG tablet     Sig: Take 1 tablet by mouth every morning     Dispense:  90 tablet     Refill:  1      Orders Placed:  Orders Placed This Encounter   Procedures    Basic Metabolic Panel     Standing Status:   Future     Standing Expiration Date:   6/5/2025    LITHOLINK     Standing Status:   Future     Standing Expiration Date:   6/11/2025            Kenneth Barrera MD    Agree with the ROS entered by the MA.

## 2024-07-10 ENCOUNTER — INITIAL CONSULT (OUTPATIENT)
Dept: OBGYN CLINIC | Age: 29
End: 2024-07-10
Payer: COMMERCIAL

## 2024-07-10 VITALS
DIASTOLIC BLOOD PRESSURE: 70 MMHG | BODY MASS INDEX: 31.31 KG/M2 | WEIGHT: 159.5 LBS | SYSTOLIC BLOOD PRESSURE: 118 MMHG | HEIGHT: 60 IN

## 2024-07-10 DIAGNOSIS — N97.0 OLIGO-OVULATION: ICD-10-CM

## 2024-07-10 DIAGNOSIS — E28.2 PCOS (POLYCYSTIC OVARIAN SYNDROME): Primary | ICD-10-CM

## 2024-07-10 PROCEDURE — 99213 OFFICE O/P EST LOW 20 MIN: CPT | Performed by: OBSTETRICS & GYNECOLOGY

## 2024-07-12 NOTE — PROGRESS NOTES
Assessment:   Diagnosis Orders   1. PCOS (polycystic ovarian syndrome)  Progesterone      2. Oligo-ovulation  Progesterone        Patient Active Problem List    Diagnosis Date Noted    Gestational diabetes 04/09/2024    Migraine 04/09/2024    MTHFR (methylene THF reductase) deficiency and homocystinuria (HCC) 04/09/2024    Kidney stone 01/10/2024    Obstructive uropathy 01/09/2024    Asthma 03/24/2013    Chest pain 03/24/2013       PLAN:  Return in about 3 months (around 10/10/2024) for follow up problem from today visit.  Semen analysis  Day 21/22 progesterone lab  [place] days 13, 15, 17, 19  Folic acid and PNV  Coenzyme Q10 and Fish oil for partner  Consider hysteroscopy if needed  After semen analysis consider regulation with OCP vs. Ovulation induction agent  Repeat Annual every 1 year  Cervical Cytology Evaluation begins at 21 years old.  If Negative Cytology, Follow-up screening per current guidelines.   Return to the office in 12 weeks.  Counseled on preventative health maintenance follow-up.  Orders Placed This Encounter   Procedures    Progesterone     Day 21 or 22 of cycle     Standing Status:   Standing     Number of Occurrences:   4     Standing Expiration Date:   7/12/2025           The patient, Sherrie Richards is a 29 y.o. female, was seen with a total time spent of 20 minutes for the visit on this date of service by the E/M provider. The time component had both face to face and non face to face time spent in determining the total time component.  Counseling and education regarding her diagnosis listed below and her options regarding those diagnoses were also included in determining her time component.      Diagnosis Orders   1. PCOS (polycystic ovarian syndrome)  Progesterone      2. Oligo-ovulation  Progesterone           The patient had her preventative health maintenance recommendations and follow-up reviewed with her at the completion of her visit.

## 2024-08-20 ENCOUNTER — HOSPITAL ENCOUNTER (OUTPATIENT)
Age: 29
Discharge: HOME | End: 2024-08-20
Payer: COMMERCIAL

## 2024-08-20 LAB — PROGEST SERPL-MCNC: 0.07 NG/ML

## 2024-08-20 PROCEDURE — 36415 COLL VENOUS BLD VENIPUNCTURE: CPT

## 2024-08-20 PROCEDURE — 84144 ASSAY OF PROGESTERONE: CPT

## 2024-08-28 ENCOUNTER — PATIENT MESSAGE (OUTPATIENT)
Dept: OBGYN CLINIC | Age: 29
End: 2024-08-28

## 2024-08-28 DIAGNOSIS — N97.0 OLIGO-OVULATION: ICD-10-CM

## 2024-08-28 DIAGNOSIS — N92.6 IRREGULAR MENSES: Primary | ICD-10-CM

## 2024-09-05 ENCOUNTER — HOSPITAL ENCOUNTER (OUTPATIENT)
Age: 29
Discharge: HOME | End: 2024-09-05
Payer: COMMERCIAL

## 2024-09-05 DIAGNOSIS — N92.6 IRREGULAR MENSES: Primary | ICD-10-CM

## 2024-09-05 DIAGNOSIS — N92.6 IRREGULAR MENSES: ICD-10-CM

## 2024-09-05 DIAGNOSIS — N97.0 OLIGO-OVULATION: ICD-10-CM

## 2024-09-05 LAB
B-HCG SERPL EIA 3RD IS-ACNC: <1 MIU/ML
PROGEST SERPL-MCNC: 10.8 NG/ML

## 2024-09-05 PROCEDURE — 84144 ASSAY OF PROGESTERONE: CPT

## 2024-09-05 PROCEDURE — 84702 CHORIONIC GONADOTROPIN TEST: CPT

## 2024-09-05 PROCEDURE — 36415 COLL VENOUS BLD VENIPUNCTURE: CPT

## 2024-09-10 ENCOUNTER — TELEPHONE (OUTPATIENT)
Dept: UROLOGY | Age: 29
End: 2024-09-10

## 2024-10-08 ASSESSMENT — PATIENT HEALTH QUESTIONNAIRE - PHQ9
SUM OF ALL RESPONSES TO PHQ QUESTIONS 1-9: 0
SUM OF ALL RESPONSES TO PHQ9 QUESTIONS 1 & 2: 0
SUM OF ALL RESPONSES TO PHQ QUESTIONS 1-9: 0
SUM OF ALL RESPONSES TO PHQ QUESTIONS 1-9: 0
SUM OF ALL RESPONSES TO PHQ9 QUESTIONS 1 & 2: 0
2. FEELING DOWN, DEPRESSED OR HOPELESS: NOT AT ALL
1. LITTLE INTEREST OR PLEASURE IN DOING THINGS: NOT AT ALL
1. LITTLE INTEREST OR PLEASURE IN DOING THINGS: NOT AT ALL
2. FEELING DOWN, DEPRESSED OR HOPELESS: NOT AT ALL
SUM OF ALL RESPONSES TO PHQ QUESTIONS 1-9: 0

## 2024-10-11 ENCOUNTER — OFFICE VISIT (OUTPATIENT)
Dept: OBGYN CLINIC | Age: 29
End: 2024-10-11
Payer: COMMERCIAL

## 2024-10-11 VITALS
HEIGHT: 60 IN | SYSTOLIC BLOOD PRESSURE: 114 MMHG | BODY MASS INDEX: 30.23 KG/M2 | DIASTOLIC BLOOD PRESSURE: 70 MMHG | WEIGHT: 154 LBS

## 2024-10-11 DIAGNOSIS — E28.2 PCOS (POLYCYSTIC OVARIAN SYNDROME): Primary | ICD-10-CM

## 2024-10-11 DIAGNOSIS — N97.0 OLIGO-OVULATION: ICD-10-CM

## 2024-10-11 PROCEDURE — 99213 OFFICE O/P EST LOW 20 MIN: CPT | Performed by: OBSTETRICS & GYNECOLOGY

## 2024-10-13 RX ORDER — LETROZOLE 2.5 MG/1
2.5 TABLET, FILM COATED ORAL DAILY
Qty: 5 TABLET | Refills: 2 | Status: SHIPPED | OUTPATIENT
Start: 2024-10-13 | End: 2024-10-18

## 2024-10-18 ENCOUNTER — HOSPITAL ENCOUNTER (OUTPATIENT)
Age: 29
Setting detail: SPECIMEN
Discharge: HOME | End: 2024-10-18
Payer: COMMERCIAL

## 2024-10-18 ENCOUNTER — TELEPHONE (OUTPATIENT)
Dept: OBGYN CLINIC | Age: 29
End: 2024-10-18

## 2024-10-18 DIAGNOSIS — N91.2 AMENORRHEA: ICD-10-CM

## 2024-10-18 DIAGNOSIS — N91.2 AMENORRHEA: Primary | ICD-10-CM

## 2024-10-18 LAB — B-HCG SERPL EIA 3RD IS-ACNC: <0.2 MIU/ML (ref 0–7)

## 2024-10-18 PROCEDURE — 84702 CHORIONIC GONADOTROPIN TEST: CPT

## 2024-10-18 PROCEDURE — 36415 COLL VENOUS BLD VENIPUNCTURE: CPT

## 2024-10-18 NOTE — TELEPHONE ENCOUNTER
Patient called requesting hcg- stated sudheer told her to call and we would place HCG her LMP was 9/11.

## 2024-10-23 DIAGNOSIS — E28.2 PCOS (POLYCYSTIC OVARIAN SYNDROME): ICD-10-CM

## 2024-10-23 DIAGNOSIS — N92.6 IRREGULAR PERIODS: ICD-10-CM

## 2024-10-28 DIAGNOSIS — N92.6 IRREGULAR PERIODS: ICD-10-CM

## 2024-10-28 DIAGNOSIS — E28.2 PCOS (POLYCYSTIC OVARIAN SYNDROME): ICD-10-CM

## 2024-10-29 NOTE — TELEPHONE ENCOUNTER
Medication Request/Refill Telephone Documentation:  Medication Requested: METFORMIN   Provider last filled by: JUAN J  Last visit: 10/11/24  Last annual/pap smear: 8/26/22  NEXT APPT: 1/6/25

## 2024-11-25 ENCOUNTER — TELEPHONE (OUTPATIENT)
Dept: OBGYN CLINIC | Age: 29
End: 2024-11-25

## 2024-11-25 ENCOUNTER — HOSPITAL ENCOUNTER (OUTPATIENT)
Age: 29
Discharge: HOME | End: 2024-11-25
Payer: COMMERCIAL

## 2024-11-25 DIAGNOSIS — N91.2 AMENORRHEA: Primary | ICD-10-CM

## 2024-11-25 DIAGNOSIS — N91.2 AMENORRHEA: ICD-10-CM

## 2024-11-25 LAB — B-HCG SERPL EIA 3RD IS-ACNC: 26.5 MIU/ML (ref 0–7)

## 2024-11-25 PROCEDURE — 36415 COLL VENOUS BLD VENIPUNCTURE: CPT

## 2024-11-25 PROCEDURE — 84702 CHORIONIC GONADOTROPIN TEST: CPT

## 2024-11-25 NOTE — TELEPHONE ENCOUNTER
Yay!! How exciting.  We will be available for anything she needs.  Recommend hydration, limit strenuous activity.  Can trend hCG if she would like, and needs IPV dating as appropriate.  Thank you.

## 2024-11-26 DIAGNOSIS — N92.6 IRREGULAR PERIODS: ICD-10-CM

## 2024-11-26 DIAGNOSIS — E28.2 PCOS (POLYCYSTIC OVARIAN SYNDROME): ICD-10-CM

## 2024-11-27 ENCOUNTER — HOSPITAL ENCOUNTER (OUTPATIENT)
Age: 29
Discharge: HOME | End: 2024-11-27
Payer: COMMERCIAL

## 2024-11-27 DIAGNOSIS — N91.2 AMENORRHEA: ICD-10-CM

## 2024-11-27 LAB — B-HCG SERPL EIA 3RD IS-ACNC: 42.6 MIU/ML (ref 0–7)

## 2024-11-27 PROCEDURE — 84702 CHORIONIC GONADOTROPIN TEST: CPT

## 2024-11-27 PROCEDURE — 36415 COLL VENOUS BLD VENIPUNCTURE: CPT

## 2024-12-02 ENCOUNTER — TELEPHONE (OUTPATIENT)
Dept: OBGYN CLINIC | Age: 29
End: 2024-12-02

## 2024-12-02 NOTE — TELEPHONE ENCOUNTER
Patient was calling to check on her hcg results. Patient stated she is having some mild cramping but just concerned because of past. Patient states that she is still taking pregnancy tests daily and she doesn't believe the lines are as dark as they should be. Informed patient that I would reach out to provider for recommendation and Dominique SARMIENTO will reach out to schedule her appointments

## 2024-12-03 ENCOUNTER — HOSPITAL ENCOUNTER (OUTPATIENT)
Age: 29
Discharge: HOME | End: 2024-12-03
Payer: COMMERCIAL

## 2024-12-03 DIAGNOSIS — Z34.90 EARLY STAGE OF PREGNANCY: Primary | ICD-10-CM

## 2024-12-03 DIAGNOSIS — Z34.90 EARLY STAGE OF PREGNANCY: ICD-10-CM

## 2024-12-03 LAB — B-HCG SERPL EIA 3RD IS-ACNC: 4.2 MIU/ML (ref 0–7)

## 2024-12-03 PROCEDURE — 36415 COLL VENOUS BLD VENIPUNCTURE: CPT

## 2024-12-03 PROCEDURE — 84702 CHORIONIC GONADOTROPIN TEST: CPT

## 2024-12-04 NOTE — TELEPHONE ENCOUNTER
Please notify patient unfortunately it appears her hCG has fallen and is now in the non pregnant range.  Please make sure she has follow up to discuss with one of the providers and discuss further plan of care.  Thank you.

## 2024-12-08 SDOH — ECONOMIC STABILITY: INCOME INSECURITY: HOW HARD IS IT FOR YOU TO PAY FOR THE VERY BASICS LIKE FOOD, HOUSING, MEDICAL CARE, AND HEATING?: NOT VERY HARD

## 2024-12-08 SDOH — ECONOMIC STABILITY: FOOD INSECURITY: WITHIN THE PAST 12 MONTHS, THE FOOD YOU BOUGHT JUST DIDN'T LAST AND YOU DIDN'T HAVE MONEY TO GET MORE.: NEVER TRUE

## 2024-12-08 SDOH — ECONOMIC STABILITY: FOOD INSECURITY: WITHIN THE PAST 12 MONTHS, YOU WORRIED THAT YOUR FOOD WOULD RUN OUT BEFORE YOU GOT MONEY TO BUY MORE.: NEVER TRUE

## 2024-12-08 SDOH — ECONOMIC STABILITY: TRANSPORTATION INSECURITY
IN THE PAST 12 MONTHS, HAS LACK OF TRANSPORTATION KEPT YOU FROM MEETINGS, WORK, OR FROM GETTING THINGS NEEDED FOR DAILY LIVING?: NO

## 2024-12-11 ENCOUNTER — OFFICE VISIT (OUTPATIENT)
Dept: OBGYN CLINIC | Age: 29
End: 2024-12-11
Payer: COMMERCIAL

## 2024-12-11 VITALS
SYSTOLIC BLOOD PRESSURE: 98 MMHG | HEIGHT: 60 IN | WEIGHT: 148 LBS | DIASTOLIC BLOOD PRESSURE: 62 MMHG | BODY MASS INDEX: 29.06 KG/M2

## 2024-12-11 DIAGNOSIS — O02.81 CHEMICAL PREGNANCY: Primary | ICD-10-CM

## 2024-12-11 DIAGNOSIS — E28.2 PCOS (POLYCYSTIC OVARIAN SYNDROME): ICD-10-CM

## 2024-12-11 DIAGNOSIS — N92.6 IRREGULAR PERIODS: ICD-10-CM

## 2024-12-11 PROCEDURE — 99213 OFFICE O/P EST LOW 20 MIN: CPT | Performed by: OBSTETRICS & GYNECOLOGY

## 2024-12-12 NOTE — PROGRESS NOTES
Sherrie Richards  2024    YOB: 1995    The patient was seen today. She is here regarding follow up from early pregnancy loss. Her bowels are regular and she is voiding without difficulty.     HPI:  Sherrie Richards is a 29 y.o. female      Pt. Was noted to have +hCG of 26.5 on .  She started to have bleeding and as we trend hCG we noticed spontaneous pregnancy loss as hCG increased to 42.6 on  and then 12/3 her hCG was 4.2.    She states she stopped bleeding on .  She denies pain or abnormal vaginal discharged today.    We discussed plan of care with next +UPT    We discussed waiting 2-3 cycles until trying again for pregnancy.        OB History    Para Term  AB Living   3 2 2 0 1 2   SAB IAB Ectopic Molar Multiple Live Births   1 0 0 0 0 2      # Outcome Date GA Lbr Jani/2nd Weight Sex Type Anes PTL Lv   3 SAB 2024     Biochemical      2 Term 10/17/19 39w5d  3.99 kg (8 lb 12.7 oz) F CS-LTranv EPI N SUSAN      Name: ANANYA,BABY GIRL SHERRIE      Apgar1: 8  Apgar5: 9   1 Term 13 39w5d  3.771 kg (8 lb 5 oz) F CS-LTranv   SUSAN       Past Medical History:   Diagnosis Date    Asthma     COVID-2021    watery eyes X 7 days. No treatment or hospitalization.    Kidney stone     MTHFR (methylene THF reductase) deficiency and homocystinuria (HCC)     UTI (urinary tract infection)     Wellness examination     PCP Brielle Hayward CNP/faviola/ last see 24.       Past Surgical History:   Procedure Laterality Date    ADENOIDECTOMY       SECTION      X 2    CYSTOSCOPY Right 2024    CYSTOSCOPY, URETEROSCOPY, HOLMIUM LASER, STENT PLACEMENT RIGHT performed by Mau Nicolas MD at [facility] OR    CYSTOSCOPY Right 2024    HOLMIUM LASER STANDBY, CYSTOSCOPY, RIGHT URETEROSCOPY, RIGHT STENT EXCHANGE, STONE BASKETING    TONSILLECTOMY      URETER SURGERY N/A 2024    HOLMIUM LASER STANDBY, CYSTOSCOPY, RIGHT URETEROSCOPY, RIGHT STENT EXCHANGE, STONE  Patient asymptomatic, resting comfortably

## 2025-02-11 ENCOUNTER — HOSPITAL ENCOUNTER (OUTPATIENT)
Age: 30
Discharge: HOME | End: 2025-02-11
Payer: COMMERCIAL

## 2025-02-11 ENCOUNTER — TELEPHONE (OUTPATIENT)
Dept: OBGYN CLINIC | Age: 30
End: 2025-02-11

## 2025-02-11 DIAGNOSIS — N91.2 AMENORRHEA: ICD-10-CM

## 2025-02-11 DIAGNOSIS — O02.81 CHEMICAL PREGNANCY: ICD-10-CM

## 2025-02-11 DIAGNOSIS — N91.2 AMENORRHEA: Primary | ICD-10-CM

## 2025-02-11 LAB
B-HCG SERPL EIA 3RD IS-ACNC: 416 MIU/ML (ref 0–7)
PROGEST SERPL-MCNC: 20.4 NG/ML

## 2025-02-11 PROCEDURE — 36415 COLL VENOUS BLD VENIPUNCTURE: CPT

## 2025-02-11 PROCEDURE — 84702 CHORIONIC GONADOTROPIN TEST: CPT

## 2025-02-11 PROCEDURE — 84144 ASSAY OF PROGESTERONE: CPT

## 2025-02-11 NOTE — TELEPHONE ENCOUNTER
LMP- 1/11/25        Pt called in as she had + pregnancy test    Per dr willard note order hcg and progesterone level    Pt aware labs ordered and can complete today    Pt had 1 day of dark old spotting which I stated can be normal - if picks up becomes bright red and heavy to notify the office    Pt stated she is having some cramping- again stated can be normal- if becomes severe or one side pain- fever chills need to notify the office as well-     Stated increase water intake and make sure bowels are moving.     Pt stated she understood.

## 2025-02-17 ENCOUNTER — TELEPHONE (OUTPATIENT)
Dept: OBGYN CLINIC | Age: 30
End: 2025-02-17

## 2025-02-17 ENCOUNTER — HOSPITAL ENCOUNTER (OUTPATIENT)
Age: 30
Discharge: HOME | End: 2025-02-17
Payer: COMMERCIAL

## 2025-02-17 DIAGNOSIS — N91.2 AMENORRHEA: Primary | ICD-10-CM

## 2025-02-17 DIAGNOSIS — N91.2 AMENORRHEA: ICD-10-CM

## 2025-02-17 LAB — B-HCG SERPL EIA 3RD IS-ACNC: 2162 MIU/ML (ref 0–7)

## 2025-02-17 PROCEDURE — 84702 CHORIONIC GONADOTROPIN TEST: CPT

## 2025-02-17 PROCEDURE — 36415 COLL VENOUS BLD VENIPUNCTURE: CPT

## 2025-02-17 NOTE — TELEPHONE ENCOUNTER
Left voicemail for patient that her hcg numbers are looking good. We would like her to get it done one more time and then I will call her to schedule appointments.

## 2025-02-19 ENCOUNTER — TELEPHONE (OUTPATIENT)
Dept: OBGYN CLINIC | Age: 30
End: 2025-02-19

## 2025-02-19 NOTE — TELEPHONE ENCOUNTER
Sherrie Richards calling reporting a positive pregnancy test.    Shrerie Richards is not a new patient.     This is not Sherrie Richards's first pregnancy.    ** Please complete if patient has previous birth history, please delete is not applicable**  Number of pregnancies: 1  Mode of delivery (vaginal//both) CHEMICAL  If NEW patient please instruct patient will need prior OBGYN records.     Sherrie Richards last menstrual cycle: 25  Based on LMP patient is 5W4D weeks     Dose patient have any concerns?   [x]YES LAST CHEMICAL PREGNANCY  []NO  If yes, please discuss concern with provider to determine if patient needs additional appointment.      Scheduling Instructions and Education  [x]If patient less than 8 weeks please schedule missed menses (30 mins) between 6-8 weeks. ALSO scheduled USN w/ IPV (w/ NURSE) to follow 2-3 weeks after missed menses   Patient education: Initial missed menses appointment will consist of a pregnancy test and to establish care and review previous births **if applicable**. There will NOT be an ultrasound at this first visit. Please review that the USN and IPV visit will be 2-3 weeks after patient's missed menses. At this appointment dating ultrasound will be complete, prenatal labs ordered, prenatal education completed, pelvic exam if indicated     Please document appointments scheduled during phone call   Missed menses date/provider: 3/10 JUAN J  USN/IPV date:USN 3/18 1:00, IPV 1:30    Please forward telephone encounter to provider appointments are scheduled with prior to closing telephone encounter.

## 2025-03-02 ENCOUNTER — NURSE TRIAGE (OUTPATIENT)
Dept: OTHER | Facility: CLINIC | Age: 30
End: 2025-03-02

## 2025-03-03 ENCOUNTER — OFFICE VISIT (OUTPATIENT)
Dept: OBGYN CLINIC | Age: 30
End: 2025-03-03
Payer: COMMERCIAL

## 2025-03-03 ENCOUNTER — APPOINTMENT (OUTPATIENT)
Dept: ULTRASOUND IMAGING | Age: 30
End: 2025-03-03
Payer: COMMERCIAL

## 2025-03-03 ENCOUNTER — HOSPITAL ENCOUNTER (EMERGENCY)
Age: 30
Discharge: HOME | End: 2025-03-03
Attending: EMERGENCY MEDICINE
Payer: COMMERCIAL

## 2025-03-03 ENCOUNTER — TELEPHONE (OUTPATIENT)
Dept: OBGYN CLINIC | Age: 30
End: 2025-03-03

## 2025-03-03 VITALS
RESPIRATION RATE: 14 BRPM | DIASTOLIC BLOOD PRESSURE: 93 MMHG | WEIGHT: 147.16 LBS | BODY MASS INDEX: 28.74 KG/M2 | OXYGEN SATURATION: 100 % | SYSTOLIC BLOOD PRESSURE: 126 MMHG | TEMPERATURE: 98.1 F | HEART RATE: 87 BPM

## 2025-03-03 VITALS
HEIGHT: 60 IN | RESPIRATION RATE: 14 BRPM | WEIGHT: 145.2 LBS | SYSTOLIC BLOOD PRESSURE: 104 MMHG | DIASTOLIC BLOOD PRESSURE: 72 MMHG | BODY MASS INDEX: 28.51 KG/M2

## 2025-03-03 DIAGNOSIS — Z83.2 FAMILY HISTORY OF FACTOR V DEFICIENCY: ICD-10-CM

## 2025-03-03 DIAGNOSIS — E72.11 MTHFR (METHYLENE THF REDUCTASE) DEFICIENCY AND HOMOCYSTINURIA: ICD-10-CM

## 2025-03-03 DIAGNOSIS — E72.12 MTHFR (METHYLENE THF REDUCTASE) DEFICIENCY AND HOMOCYSTINURIA: ICD-10-CM

## 2025-03-03 DIAGNOSIS — O20.0 THREATENED MISCARRIAGE: ICD-10-CM

## 2025-03-03 DIAGNOSIS — N91.2 AMENORRHEA: Primary | ICD-10-CM

## 2025-03-03 DIAGNOSIS — Z82.49 FAMILY HISTORY OF DVT: ICD-10-CM

## 2025-03-03 DIAGNOSIS — O46.90 VAGINAL BLEEDING IN PREGNANCY: Primary | ICD-10-CM

## 2025-03-03 DIAGNOSIS — O20.0 THREATENED MISCARRIAGE: Primary | ICD-10-CM

## 2025-03-03 DIAGNOSIS — Z87.59 HISTORY OF MISCARRIAGE: ICD-10-CM

## 2025-03-03 LAB
ANION GAP SERPL CALCULATED.3IONS-SCNC: 11 MMOL/L (ref 9–16)
B-HCG SERPL EIA 3RD IS-ACNC: 1572 MIU/ML
BACTERIA URNS QL MICRO: NORMAL
BASOPHILS # BLD: <0.03 K/UL (ref 0–0.2)
BASOPHILS NFR BLD: 0 % (ref 0–2)
BILIRUB UR QL STRIP: NEGATIVE
BUN SERPL-MCNC: 8 MG/DL (ref 6–20)
CALCIUM SERPL-MCNC: 9.4 MG/DL (ref 8.6–10.4)
CANDIDA SPECIES: NEGATIVE
CASTS #/AREA URNS LPF: NORMAL /LPF (ref 0–8)
CHLORIDE SERPL-SCNC: 104 MMOL/L (ref 98–107)
CLARITY UR: ABNORMAL
CO2 SERPL-SCNC: 21 MMOL/L (ref 20–31)
COLOR UR: YELLOW
CREAT SERPL-MCNC: 0.7 MG/DL (ref 0.6–0.9)
EOSINOPHIL # BLD: 0.17 K/UL (ref 0–0.44)
EOSINOPHILS RELATIVE PERCENT: 2 % (ref 1–4)
EPI CELLS #/AREA URNS HPF: NORMAL /HPF (ref 0–5)
ERYTHROCYTE [DISTWIDTH] IN BLOOD BY AUTOMATED COUNT: 12 % (ref 11.8–14.4)
GARDNERELLA VAGINALIS: NEGATIVE
GFR, ESTIMATED: >90 ML/MIN/1.73M2
GLUCOSE SERPL-MCNC: 96 MG/DL (ref 74–99)
GLUCOSE UR STRIP-MCNC: NEGATIVE MG/DL
HCT VFR BLD AUTO: 40.9 % (ref 36.3–47.1)
HGB BLD-MCNC: 13.9 G/DL (ref 11.9–15.1)
HGB UR QL STRIP.AUTO: ABNORMAL
IMM GRANULOCYTES # BLD AUTO: <0.03 K/UL (ref 0–0.3)
IMM GRANULOCYTES NFR BLD: 0 %
KETONES UR STRIP-MCNC: NEGATIVE MG/DL
LEUKOCYTE ESTERASE UR QL STRIP: ABNORMAL
LYMPHOCYTES NFR BLD: 1.83 K/UL (ref 1.1–3.7)
LYMPHOCYTES RELATIVE PERCENT: 23 % (ref 24–43)
MCH RBC QN AUTO: 30.1 PG (ref 25.2–33.5)
MCHC RBC AUTO-ENTMCNC: 34 G/DL (ref 28.4–34.8)
MCV RBC AUTO: 88.5 FL (ref 82.6–102.9)
MONOCYTES NFR BLD: 0.52 K/UL (ref 0.1–1.2)
MONOCYTES NFR BLD: 7 % (ref 3–12)
NEUTROPHILS NFR BLD: 68 % (ref 36–65)
NEUTS SEG NFR BLD: 5.43 K/UL (ref 1.5–8.1)
NITRITE UR QL STRIP: NEGATIVE
NRBC BLD-RTO: 0 PER 100 WBC
PH UR STRIP: 6.5 [PH] (ref 5–8)
PLATELET # BLD AUTO: 291 K/UL (ref 138–453)
PMV BLD AUTO: 10.1 FL (ref 8.1–13.5)
POTASSIUM SERPL-SCNC: 4 MMOL/L (ref 3.7–5.3)
PROT UR STRIP-MCNC: NEGATIVE MG/DL
RBC # BLD AUTO: 4.62 M/UL (ref 3.95–5.11)
RBC #/AREA URNS HPF: NORMAL /HPF (ref 0–4)
SODIUM SERPL-SCNC: 136 MMOL/L (ref 136–145)
SOURCE: NORMAL
SP GR UR STRIP: 1.01 (ref 1–1.03)
TRICHOMONAS: NEGATIVE
UROBILINOGEN UR STRIP-ACNC: NORMAL EU/DL (ref 0–1)
WBC #/AREA URNS HPF: NORMAL /HPF (ref 0–5)
WBC OTHER # BLD: 8 K/UL (ref 3.5–11.3)

## 2025-03-03 PROCEDURE — 81001 URINALYSIS AUTO W/SCOPE: CPT

## 2025-03-03 PROCEDURE — 85025 COMPLETE CBC W/AUTO DIFF WBC: CPT

## 2025-03-03 PROCEDURE — 87591 N.GONORRHOEAE DNA AMP PROB: CPT

## 2025-03-03 PROCEDURE — 84702 CHORIONIC GONADOTROPIN TEST: CPT

## 2025-03-03 PROCEDURE — 87660 TRICHOMONAS VAGIN DIR PROBE: CPT

## 2025-03-03 PROCEDURE — 99284 EMERGENCY DEPT VISIT MOD MDM: CPT

## 2025-03-03 PROCEDURE — 99214 OFFICE O/P EST MOD 30 MIN: CPT | Performed by: NURSE PRACTITIONER

## 2025-03-03 PROCEDURE — 87510 GARDNER VAG DNA DIR PROBE: CPT

## 2025-03-03 PROCEDURE — 76817 TRANSVAGINAL US OBSTETRIC: CPT

## 2025-03-03 PROCEDURE — 87480 CANDIDA DNA DIR PROBE: CPT

## 2025-03-03 PROCEDURE — 87491 CHLMYD TRACH DNA AMP PROBE: CPT

## 2025-03-03 PROCEDURE — 80048 BASIC METABOLIC PNL TOTAL CA: CPT

## 2025-03-03 SDOH — ECONOMIC STABILITY: FOOD INSECURITY: WITHIN THE PAST 12 MONTHS, YOU WORRIED THAT YOUR FOOD WOULD RUN OUT BEFORE YOU GOT MONEY TO BUY MORE.: PATIENT DECLINED

## 2025-03-03 SDOH — ECONOMIC STABILITY: FOOD INSECURITY: WITHIN THE PAST 12 MONTHS, THE FOOD YOU BOUGHT JUST DIDN'T LAST AND YOU DIDN'T HAVE MONEY TO GET MORE.: PATIENT DECLINED

## 2025-03-03 ASSESSMENT — PATIENT HEALTH QUESTIONNAIRE - PHQ9: DEPRESSION UNABLE TO ASSESS: PT REFUSES

## 2025-03-03 NOTE — DISCHARGE INSTRUCTIONS
You were seen in the ER today for vaginal bleeding in pregnancy.  Blood count is stable, urine without evidence of infection.  Ultrasound shows an intrauterine pregnancy, no embryo was identified.  This could be because of how early on you are in your pregnancy.  You need to follow-up with OB next week as scheduled.  Return to the ER if heavy bleeding, severe abdominal pain, lightheadedness, dizziness, any other concerns.

## 2025-03-03 NOTE — ED PROVIDER NOTES
[facility] EMERGENCY DEPARTMENT     Emergency Department     Faculty Attestation    I performed a history and physical examination of the patient and discussed management with the resident. I reviewed the resident’s note and agree with the documented findings and plan of care. Any areas of disagreement are noted on the chart. I was personally present for the key portions of any procedures. I have documented in the chart those procedures where I was not present during the key portions. I have reviewed the emergency nurses triage note. I agree with the chief complaint, past medical history, past surgical history, allergies, medications, social and family history as documented unless otherwise noted below. For Physician Assistant/ Nurse Practitioner cases/documentation I have personally evaluated this patient and have completed at least one if not all key elements of the E/M (history, physical exam, and MDM). Additional findings are as noted.    Note Started: 10:48 AM EST    Patient with vaginal bleeding spotting since yesterday.  She is several weeks pregnant by dates.  History of prior miscarriages.  Some cramping but no true pain no nausea vomiting no fevers.  On exam nontoxic well-appearing chatting with her significant other.  Abdomen soft no focal tenderness rebound or guarding.  Will confirm pregnancy, proceed with first trimester workup.  Chart review in care everywhere shows blood type is B+      Critical Care     none    Mateo Mratin MD, FACEP, FAAEM  Attending Emergency  Physician           Mateo Martin MD  03/03/25 1211

## 2025-03-03 NOTE — PROGRESS NOTES
Chief Complaint:  Chief Complaint   Patient presents with    Follow-up       HPI:  Sherrie presents today complaining of vaginal bleeding in pregnancy.  By her LMP Gestational age should be around 7w2d  She states yesterday she started to note dark red spotting.  She monitor throughout the day she states that she noted progressed into this morning she states she has had some back pain and cramping but also has been having gas and constipation and then had diarrhea yesterday so she is unsure if related to that.  She is also noted some nausea denies vomiting.  She denies abnormal vaginal discharge odor itching or UTI symptoms.  Denies recent intercourse.  Denies trauma.  She went to the ER this a.m. for the vaginal bleeding and she had blood work, urinalysis, and pelvic exam with cultures and ultrasound.   She states when came home from ER she did notice some increase in clotting but she states they are still very small at this point.      LMP: Patient's last menstrual period was 01/11/2025 (exact date).    She states periods had been more regular past 5-6 months    Previous pregnancy history:   SAb or ectopic pregnancy: chemical pregnancy in December 2024    She states her mother had a history of VTE and has a clotting disorder which not sure which 1 it is she states she also had an aunt who had factor V.  She states she did have thrombophilia testing though when she was younger and then also with her last pregnancy and was only found to have MTHFR.    Denies known chromosomal or genetic disorders in her or FOB. Same FOB for all pregnancies     She originally had hCG levels checked when called with positive home pregnancy test.    2/11/2025-416  2/17/2025-2162  In ER 3/3/2025 -1572        REVIEW OF SYSTEMS:    1. Constitutional: No fever, chills or malaise.  No weight change or fatigue  2. Head and eyes: No headache, dizziness or trauma.  No visual changes  3. ENT: No hearing loss, tinnitus, sinus or taste

## 2025-03-03 NOTE — TELEPHONE ENCOUNTER
Patient went to ER yesterday with vaginal bleeding. Was not filling up a pad. HCG was drawn 1,572. Numbers dropped since last draw on 2/17-2,162. Patient is having spotting when she wipes.    Per [place] pt scheduled for an ER follow up with first available provider.

## 2025-03-03 NOTE — ED TRIAGE NOTES
Pt presents to ED room 08 ambulatory from triage c/o vaginal bleeding with small clots ongoing x1 days. Pt reports that she is around 7 weeks pregnant and states she has had a total of 4 pregnancies and a recent miscarriage in the past December. Pt reports that she also has been experiencing lower R sided back pain along with the vaginal bleeding. Pt reports that she went through two panty liners yesterday and has not had to wear any pad today. Pt also reports she has been experiencing diarrhea the last couple of days as well. Pt resting on stretcher, NAD noted, RR even and non labored.

## 2025-03-04 ENCOUNTER — CARE COORDINATION (OUTPATIENT)
Dept: OTHER | Facility: CLINIC | Age: 30
End: 2025-03-04

## 2025-03-04 LAB
C TRACH DNA SPEC QL PROBE+SIG AMP: NEGATIVE
N GONORRHOEA DNA SPEC QL PROBE+SIG AMP: NEGATIVE
SPECIMEN DESCRIPTION: NORMAL

## 2025-03-05 ENCOUNTER — HOSPITAL ENCOUNTER (OUTPATIENT)
Age: 30
Discharge: HOME | End: 2025-03-05
Payer: COMMERCIAL

## 2025-03-05 DIAGNOSIS — O46.90 VAGINAL BLEEDING IN PREGNANCY: ICD-10-CM

## 2025-03-05 DIAGNOSIS — O20.0 THREATENED MISCARRIAGE: ICD-10-CM

## 2025-03-05 DIAGNOSIS — Z87.59 HISTORY OF MISCARRIAGE: ICD-10-CM

## 2025-03-05 LAB — B-HCG SERPL EIA 3RD IS-ACNC: 219 MIU/ML (ref 0–7)

## 2025-03-05 PROCEDURE — 36415 COLL VENOUS BLD VENIPUNCTURE: CPT

## 2025-03-05 PROCEDURE — 84702 CHORIONIC GONADOTROPIN TEST: CPT

## 2025-03-06 ENCOUNTER — CARE COORDINATION (OUTPATIENT)
Dept: OTHER | Facility: CLINIC | Age: 30
End: 2025-03-06

## 2025-03-10 ENCOUNTER — CARE COORDINATION (OUTPATIENT)
Dept: OTHER | Facility: CLINIC | Age: 30
End: 2025-03-10

## 2025-03-14 ENCOUNTER — RESULTS FOLLOW-UP (OUTPATIENT)
Dept: OBGYN CLINIC | Age: 30
End: 2025-03-14

## (undated) DEVICE — PACK PROCEDURE SURG CYSTO SVMMC LF

## (undated) DEVICE — ST CHARLES CYSTO: Brand: MEDLINE INDUSTRIES, INC.

## (undated) DEVICE — MASTISOL ADHESIVE AMPULE

## (undated) DEVICE — GOWN,SIRUS,NONRNF,SETINSLV,XL,20/CS: Brand: MEDLINE

## (undated) DEVICE — ADAPTER URO SCP UROLOK LL

## (undated) DEVICE — GUIDEWIRE URO L150CM DIA .035IN STIFF NIT HYDRPHL STR TIP

## (undated) DEVICE — SOLUTION IRRIG 1000ML STRL H2O USP PLAS POUR BTL

## (undated) DEVICE — DUAL LUMEN URETERAL CATHETER

## (undated) DEVICE — GLOVE ORANGE PI 7 1/2   MSG9075

## (undated) DEVICE — SINGLE-USE DIGITAL FLEXIBLE URETEROSCOPE: Brand: LITHOVUE

## (undated) DEVICE — DRAPE,REIN 53X77,STERILE: Brand: MEDLINE

## (undated) DEVICE — SINGLE ACTION PUMPING SYSTEM

## (undated) DEVICE — NITINOL STONE RETRIEVAL BASKET: Brand: ZERO TIP

## (undated) DEVICE — STRIP,CLOSURE,WOUND,MEDI-STRIP,1/2X4: Brand: MEDLINE

## (undated) DEVICE — SOLUTION IRRIG 3000ML 0.9% SOD CHL USP UROMATIC PLAS CONT

## (undated) DEVICE — STRAP ARMBRD W1.5XL32IN FOAM STR YET SFT W/ HK AND LOOP